# Patient Record
Sex: FEMALE | Race: WHITE | HISPANIC OR LATINO
[De-identification: names, ages, dates, MRNs, and addresses within clinical notes are randomized per-mention and may not be internally consistent; named-entity substitution may affect disease eponyms.]

---

## 2022-05-12 PROBLEM — Z00.00 ENCOUNTER FOR PREVENTIVE HEALTH EXAMINATION: Status: ACTIVE | Noted: 2022-05-12

## 2022-05-17 ENCOUNTER — NON-APPOINTMENT (OUTPATIENT)
Age: 32
End: 2022-05-17

## 2022-05-17 ENCOUNTER — APPOINTMENT (OUTPATIENT)
Dept: OBGYN | Facility: CLINIC | Age: 32
End: 2022-05-17
Payer: COMMERCIAL

## 2022-05-17 VITALS
HEIGHT: 61 IN | BODY MASS INDEX: 35.87 KG/M2 | SYSTOLIC BLOOD PRESSURE: 120 MMHG | WEIGHT: 190 LBS | DIASTOLIC BLOOD PRESSURE: 66 MMHG

## 2022-05-17 PROCEDURE — 99395 PREV VISIT EST AGE 18-39: CPT

## 2022-05-17 PROCEDURE — 36415 COLL VENOUS BLD VENIPUNCTURE: CPT

## 2022-05-19 LAB — HPV HIGH+LOW RISK DNA PNL CVX: NOT DETECTED

## 2022-05-19 NOTE — DISCUSSION/SUMMARY
[FreeTextEntry1] : Ioana and I had an extensive discussion about the pathology and physiology involved in conception.  Using a pelvic model I demonstrated where the passes are sperm travels through the cervix into the fallopian tube and hopefully to find an headache for conception we discussed all the areas where problems can arise.  It is curious that she has been in a relationship for quite some time and not protecting herself with contraceptives she has not had a pregnancy.  She is very interested in pursuing a pregnancy and we discussed some of the things that we can do an advance to evaluate her.  We will schedule a hysterosalpingogram to make sure her tubes are open and we'll do some blood work to check her female hormone levels prolactin level thyroid-stimulating hormone level and her hemoglobin A1c to rule out type 2 diabetes.\par \par We will also consider a semen analysis for her  and I gave her instructions about how to obtain this.  We discussed the timing for the hysterosalpingogram which should be right after her period.  All questions were answered support was given and she will return to the office in 3 weeks time.

## 2022-05-19 NOTE — HISTORY OF PRESENT ILLNESS
[Y] : Patient is sexually active [N] : Patient denies prior pregnancies [Menarche Age: ____] : age at menarche was [unfilled] [Currently Active] : currently active [PapSmeardate] : 4/1/19 [TextBox_31] : as per pt  [LMPDate] : 4/15/22 [PGHxTotal] : 0 [FreeTextEntry1] : 4/15/22

## 2022-05-19 NOTE — PHYSICAL EXAM
[Chaperone Present] : A chaperone was present in the examining room during all aspects of the physical examination [FreeTextEntry1] : Fatoumata Stiles MA [Appropriately responsive] : appropriately responsive [Alert] : alert [No Acute Distress] : no acute distress [No Lymphadenopathy] : no lymphadenopathy [Regular Rate Rhythm] : regular rate rhythm [No Murmurs] : no murmurs [Clear to Auscultation B/L] : clear to auscultation bilaterally [Soft] : soft [Non-tender] : non-tender [Non-distended] : non-distended [No HSM] : No HSM [No Lesions] : no lesions [No Mass] : no mass [Oriented x3] : oriented x3 [Examination Of The Breasts] : a normal appearance [No Masses] : no breast masses were palpable [Labia Majora] : normal [Labia Minora] : normal [Normal] : normal [Uterine Adnexae] : normal

## 2022-05-24 LAB — CYTOLOGY CVX/VAG DOC THIN PREP: NORMAL

## 2022-07-01 ENCOUNTER — APPOINTMENT (OUTPATIENT)
Dept: RADIOLOGY | Facility: HOSPITAL | Age: 32
End: 2022-07-01

## 2022-07-22 ENCOUNTER — NON-APPOINTMENT (OUTPATIENT)
Age: 32
End: 2022-07-22

## 2022-08-02 ENCOUNTER — NON-APPOINTMENT (OUTPATIENT)
Age: 32
End: 2022-08-02

## 2023-01-17 ENCOUNTER — APPOINTMENT (OUTPATIENT)
Dept: ANTEPARTUM | Facility: CLINIC | Age: 33
End: 2023-01-17
Payer: COMMERCIAL

## 2023-01-17 ENCOUNTER — ASOB RESULT (OUTPATIENT)
Age: 33
End: 2023-01-17

## 2023-01-17 PROCEDURE — 76817 TRANSVAGINAL US OBSTETRIC: CPT

## 2023-01-19 ENCOUNTER — APPOINTMENT (OUTPATIENT)
Dept: OBGYN | Facility: CLINIC | Age: 33
End: 2023-01-19
Payer: COMMERCIAL

## 2023-01-19 VITALS
BODY MASS INDEX: 36.82 KG/M2 | WEIGHT: 195 LBS | DIASTOLIC BLOOD PRESSURE: 79 MMHG | HEIGHT: 61 IN | SYSTOLIC BLOOD PRESSURE: 120 MMHG

## 2023-01-19 DIAGNOSIS — Z80.0 FAMILY HISTORY OF MALIGNANT NEOPLASM OF DIGESTIVE ORGANS: ICD-10-CM

## 2023-01-19 DIAGNOSIS — Z78.9 OTHER SPECIFIED HEALTH STATUS: ICD-10-CM

## 2023-01-19 LAB
BASOPHILS # BLD AUTO: 0.04 K/UL
BASOPHILS NFR BLD AUTO: 0.6 %
EOSINOPHIL # BLD AUTO: 0.14 K/UL
EOSINOPHIL NFR BLD AUTO: 2.3 %
ESTIMATED AVERAGE GLUCOSE: 105 MG/DL
ESTRADIOL SERPL-MCNC: 19 PG/ML
FSH SERPL-MCNC: 5.3 IU/L
HBA1C MFR BLD HPLC: 5.3 %
HCT VFR BLD CALC: 41.1 %
HGB BLD-MCNC: 13 G/DL
IMM GRANULOCYTES NFR BLD AUTO: 0.2 %
LH SERPL-ACNC: 4 IU/L
LYMPHOCYTES # BLD AUTO: 2.53 K/UL
LYMPHOCYTES NFR BLD AUTO: 40.9 %
MAN DIFF?: NORMAL
MCHC RBC-ENTMCNC: 28.9 PG
MCHC RBC-ENTMCNC: 31.6 GM/DL
MCV RBC AUTO: 91.3 FL
MONOCYTES # BLD AUTO: 0.5 K/UL
MONOCYTES NFR BLD AUTO: 8.1 %
NEUTROPHILS # BLD AUTO: 2.96 K/UL
NEUTROPHILS NFR BLD AUTO: 47.9 %
PLATELET # BLD AUTO: 275 K/UL
PROLACTIN SERPL-MCNC: 10.4 NG/ML
RBC # BLD: 4.5 M/UL
RBC # FLD: 12.4 %
TSH SERPL-ACNC: 1.4 UIU/ML
WBC # FLD AUTO: 6.18 K/UL

## 2023-01-19 PROCEDURE — 99214 OFFICE O/P EST MOD 30 MIN: CPT

## 2023-01-19 PROCEDURE — 36415 COLL VENOUS BLD VENIPUNCTURE: CPT

## 2023-01-19 NOTE — HISTORY OF PRESENT ILLNESS
[PapSmeardate] : 5/17/22 [TextBox_31] : neg [HPVDate] : 5/17/22 [TextBox_78] : neg [LMPDate] : 11/21/22 [PGxTotal] : 1 [TextBox_6] : 11/21/22 [FreeTextEntry1] : 11/21/22

## 2023-01-19 NOTE — PHYSICAL EXAM
[Chaperone Present] : A chaperone was present in the examining room during all aspects of the physical examination [Appropriately responsive] : appropriately responsive [FreeTextEntry1] : Leeann Donnelly [Alert] : alert [No Acute Distress] : no acute distress [No Lymphadenopathy] : no lymphadenopathy [Regular Rate Rhythm] : regular rate rhythm [Clear to Auscultation B/L] : clear to auscultation bilaterally [Soft] : soft [Non-tender] : non-tender [No Mass] : no mass [Labia Majora] : normal [Labia Minora] : normal [Normal] : normal [Uterine Adnexae] : normal

## 2023-01-19 NOTE — PLAN
[FreeTextEntry1] : We discussed the nutritional needs and restrictions of pregnancy. She is of [place] and [place] descent. She will have universal carrier screening, We discussed the possible outcomes, pros and cons of this and the benefit to understanding risk for the baby.\par \par We discussed the collaborative nature of our practice and she is aware that we [place] [facility] [place]. \par \par All questions were answered and she will RTO in 3 weeks for a Nuchal sonogram and NIPs.

## 2023-02-07 ENCOUNTER — APPOINTMENT (OUTPATIENT)
Dept: ANTEPARTUM | Facility: CLINIC | Age: 33
End: 2023-02-07

## 2023-02-07 ENCOUNTER — APPOINTMENT (OUTPATIENT)
Dept: ANTEPARTUM | Facility: CLINIC | Age: 33
End: 2023-02-07
Payer: COMMERCIAL

## 2023-02-07 ENCOUNTER — ASOB RESULT (OUTPATIENT)
Age: 33
End: 2023-02-07

## 2023-02-07 ENCOUNTER — APPOINTMENT (OUTPATIENT)
Dept: OBGYN | Facility: CLINIC | Age: 33
End: 2023-02-07

## 2023-02-07 PROCEDURE — 76801 OB US < 14 WKS SINGLE FETUS: CPT

## 2023-02-14 ENCOUNTER — NON-APPOINTMENT (OUTPATIENT)
Age: 33
End: 2023-02-14

## 2023-02-15 ENCOUNTER — NON-APPOINTMENT (OUTPATIENT)
Age: 33
End: 2023-02-15

## 2023-02-21 ENCOUNTER — APPOINTMENT (OUTPATIENT)
Dept: ANTEPARTUM | Facility: CLINIC | Age: 33
End: 2023-02-21
Payer: COMMERCIAL

## 2023-02-21 ENCOUNTER — ASOB RESULT (OUTPATIENT)
Age: 33
End: 2023-02-21

## 2023-02-21 ENCOUNTER — APPOINTMENT (OUTPATIENT)
Dept: OBGYN | Facility: CLINIC | Age: 33
End: 2023-02-21
Payer: COMMERCIAL

## 2023-02-21 VITALS
HEIGHT: 61 IN | BODY MASS INDEX: 37.19 KG/M2 | WEIGHT: 197 LBS | DIASTOLIC BLOOD PRESSURE: 79 MMHG | SYSTOLIC BLOOD PRESSURE: 117 MMHG

## 2023-02-21 LAB
ABO + RH PNL BLD: NORMAL
B19V IGG SER QL IA: 6.76 INDEX
B19V IGG+IGM SER-IMP: NORMAL
B19V IGG+IGM SER-IMP: POSITIVE
B19V IGM FLD-ACNC: 0.11 INDEX
B19V IGM SER-ACNC: NEGATIVE
BASOPHILS # BLD AUTO: 0.05 K/UL
BASOPHILS NFR BLD AUTO: 0.5 %
BILIRUB UR QL STRIP: NORMAL
BLD GP AB SCN SERPL QL: NORMAL
C TRACH RRNA SPEC QL NAA+PROBE: NOT DETECTED
CMV IGG SERPL QL: <0.2 U/ML
CMV IGG SERPL-IMP: NEGATIVE
CMV IGM SERPL QL: <8 AU/ML
CMV IGM SERPL QL: NEGATIVE
EOSINOPHIL # BLD AUTO: 0.07 K/UL
EOSINOPHIL NFR BLD AUTO: 0.7 %
ESTIMATED AVERAGE GLUCOSE: 103 MG/DL
GLUCOSE UR-MCNC: NORMAL
HBA1C MFR BLD HPLC: 5.2 %
HBV SURFACE AG SER QL: NONREACTIVE
HCG UR QL: 0.2 EU/DL
HCG UR QL: POSITIVE
HCT VFR BLD CALC: 38 %
HGB A MFR BLD: 97.5 %
HGB A2 MFR BLD: 2.5 %
HGB BLD-MCNC: 13.2 G/DL
HGB FRACT BLD-IMP: NORMAL
HGB UR QL STRIP.AUTO: ABNORMAL
HIV1+2 AB SPEC QL IA.RAPID: NONREACTIVE
IMM GRANULOCYTES NFR BLD AUTO: 0.5 %
KETONES UR-MCNC: NORMAL
LEUKOCYTE ESTERASE UR QL STRIP: NORMAL
LYMPHOCYTES # BLD AUTO: 2.31 K/UL
LYMPHOCYTES NFR BLD AUTO: 22.4 %
MAN DIFF?: NORMAL
MCHC RBC-ENTMCNC: 30.6 PG
MCHC RBC-ENTMCNC: 34.7 GM/DL
MCV RBC AUTO: 88.2 FL
MEV IGG FLD QL IA: 188 AU/ML
MEV IGG+IGM SER-IMP: POSITIVE
MONOCYTES # BLD AUTO: 0.64 K/UL
MONOCYTES NFR BLD AUTO: 6.2 %
N GONORRHOEA RRNA SPEC QL NAA+PROBE: NOT DETECTED
NEUTROPHILS # BLD AUTO: 7.2 K/UL
NEUTROPHILS NFR BLD AUTO: 69.7 %
NITRITE UR QL STRIP: NORMAL
PH UR STRIP: 6
PLATELET # BLD AUTO: 303 K/UL
PROT UR STRIP-MCNC: NORMAL
QUALITY CONTROL: YES
RBC # BLD: 4.31 M/UL
RBC # FLD: 12.2 %
RUBV IGG FLD-ACNC: 1.8 INDEX
RUBV IGG SER-IMP: POSITIVE
SOURCE AMPLIFICATION: NORMAL
SP GR UR STRIP: 1.02
T GONDII AB SER-IMP: NEGATIVE
T GONDII IGM SER QL: <3 AU/ML
T PALLIDUM AB SER QL IA: NEGATIVE
TSH SERPL-ACNC: 0.94 UIU/ML
WBC # FLD AUTO: 10.32 K/UL

## 2023-02-21 PROCEDURE — 0502F SUBSEQUENT PRENATAL CARE: CPT

## 2023-02-21 PROCEDURE — 76801 OB US < 14 WKS SINGLE FETUS: CPT

## 2023-02-21 PROCEDURE — 36415 COLL VENOUS BLD VENIPUNCTURE: CPT

## 2023-02-28 ENCOUNTER — ASOB RESULT (OUTPATIENT)
Age: 33
End: 2023-02-28

## 2023-02-28 ENCOUNTER — APPOINTMENT (OUTPATIENT)
Dept: MATERNAL FETAL MEDICINE | Facility: CLINIC | Age: 33
End: 2023-02-28
Payer: COMMERCIAL

## 2023-02-28 PROCEDURE — 99202 OFFICE O/P NEW SF 15 MIN: CPT | Mod: 95

## 2023-03-06 ENCOUNTER — NON-APPOINTMENT (OUTPATIENT)
Age: 33
End: 2023-03-06

## 2023-03-13 ENCOUNTER — NON-APPOINTMENT (OUTPATIENT)
Age: 33
End: 2023-03-13

## 2023-03-17 ENCOUNTER — NON-APPOINTMENT (OUTPATIENT)
Age: 33
End: 2023-03-17

## 2023-03-21 ENCOUNTER — NON-APPOINTMENT (OUTPATIENT)
Age: 33
End: 2023-03-21

## 2023-03-22 ENCOUNTER — NON-APPOINTMENT (OUTPATIENT)
Age: 33
End: 2023-03-22

## 2023-03-28 ENCOUNTER — APPOINTMENT (OUTPATIENT)
Dept: OBGYN | Facility: CLINIC | Age: 33
End: 2023-03-28
Payer: COMMERCIAL

## 2023-03-28 VITALS
DIASTOLIC BLOOD PRESSURE: 69 MMHG | SYSTOLIC BLOOD PRESSURE: 107 MMHG | HEIGHT: 61 IN | BODY MASS INDEX: 37.57 KG/M2 | WEIGHT: 199 LBS

## 2023-03-28 LAB
BILIRUB UR QL STRIP: NORMAL
CHROMOSOME13 INTERPRETATION: NORMAL
CHROMOSOME13 TEST RESULT: NORMAL
CHROMOSOME18 INTERPRETATION: NORMAL
CHROMOSOME18 TEST RESULT: NORMAL
CHROMOSOME21 INTERPRETATION: NORMAL
CHROMOSOME21 TEST RESULT: NORMAL
FETAL FRACTION: NORMAL
GLUCOSE UR-MCNC: NORMAL
HCG UR QL: 0.2 EU/DL
HGB UR QL STRIP.AUTO: NORMAL
KETONES UR-MCNC: NORMAL
LEUKOCYTE ESTERASE UR QL STRIP: NORMAL
MICRODELETIONS INTERPRETATION: NORMAL
MICRODELETIONS RESULT: NORMAL
NITRITE UR QL STRIP: NORMAL
PERFORMANCE AND LIMITATIONS: NORMAL
PH UR STRIP: 5.5
PROT UR STRIP-MCNC: NORMAL
SEX CHROMOSOME INTERPRETATION: NORMAL
SEX CHROMOSOME TEST RESULT: NORMAL
SP GR UR STRIP: 1.01
VERIFI WITH MICRODELETIONS: NOT DETECTED

## 2023-03-28 PROCEDURE — 0502F SUBSEQUENT PRENATAL CARE: CPT

## 2023-03-28 PROCEDURE — 36415 COLL VENOUS BLD VENIPUNCTURE: CPT

## 2023-04-07 ENCOUNTER — APPOINTMENT (OUTPATIENT)
Dept: MATERNAL FETAL MEDICINE | Facility: CLINIC | Age: 33
End: 2023-04-07
Payer: COMMERCIAL

## 2023-04-07 ENCOUNTER — ASOB RESULT (OUTPATIENT)
Age: 33
End: 2023-04-07

## 2023-04-07 PROCEDURE — 99212 OFFICE O/P EST SF 10 MIN: CPT | Mod: 95

## 2023-04-12 ENCOUNTER — APPOINTMENT (OUTPATIENT)
Dept: ANTEPARTUM | Facility: CLINIC | Age: 33
End: 2023-04-12
Payer: COMMERCIAL

## 2023-04-12 ENCOUNTER — NON-APPOINTMENT (OUTPATIENT)
Age: 33
End: 2023-04-12

## 2023-04-12 ENCOUNTER — ASOB RESULT (OUTPATIENT)
Age: 33
End: 2023-04-12

## 2023-04-12 PROCEDURE — 76817 TRANSVAGINAL US OBSTETRIC: CPT

## 2023-04-12 PROCEDURE — 76811 OB US DETAILED SNGL FETUS: CPT

## 2023-04-13 ENCOUNTER — NON-APPOINTMENT (OUTPATIENT)
Age: 33
End: 2023-04-13

## 2023-04-25 ENCOUNTER — APPOINTMENT (OUTPATIENT)
Dept: OBGYN | Facility: CLINIC | Age: 33
End: 2023-04-25
Payer: COMMERCIAL

## 2023-04-25 VITALS
SYSTOLIC BLOOD PRESSURE: 113 MMHG | WEIGHT: 202 LBS | HEIGHT: 61 IN | BODY MASS INDEX: 38.14 KG/M2 | DIASTOLIC BLOOD PRESSURE: 72 MMHG

## 2023-04-25 LAB
AFP MOM: 0.94
AFP VALUE: 35.3 NG/ML
ALPHA FETOPROTEIN SERUM COMMENT: NORMAL
ALPHA FETOPROTEIN SERUM INTERPRETATION: NORMAL
ALPHA FETOPROTEIN SERUM RESULTS: NORMAL
ALPHA FETOPROTEIN SERUM TEST RESULTS: NORMAL
BILIRUB UR QL STRIP: NORMAL
GESTATIONAL AGE BASED ON: NORMAL
GESTATIONAL AGE ON COLLECTION DATE: 18 WEEKS
GLUCOSE UR-MCNC: NORMAL
HCG UR QL: 0.2 EU/DL
HGB UR QL STRIP.AUTO: NORMAL
INSULIN DEP DIABETES: NO
KETONES UR-MCNC: NORMAL
LEUKOCYTE ESTERASE UR QL STRIP: NORMAL
MATERNAL AGE AT EDD AFP: 31.3 YR
MULTIPLE GESTATION: NO
NITRITE UR QL STRIP: NORMAL
OSBR RISK 1 IN: NORMAL
PH UR STRIP: 6.5
PROT UR STRIP-MCNC: NORMAL
RACE: NORMAL
SP GR UR STRIP: 1.02
WEIGHT AFP: 199 LBS

## 2023-04-25 PROCEDURE — 0502F SUBSEQUENT PRENATAL CARE: CPT

## 2023-04-26 ENCOUNTER — ASOB RESULT (OUTPATIENT)
Age: 33
End: 2023-04-26

## 2023-04-26 ENCOUNTER — APPOINTMENT (OUTPATIENT)
Dept: ANTEPARTUM | Facility: CLINIC | Age: 33
End: 2023-04-26
Payer: COMMERCIAL

## 2023-04-26 PROCEDURE — 76816 OB US FOLLOW-UP PER FETUS: CPT

## 2023-05-23 ENCOUNTER — APPOINTMENT (OUTPATIENT)
Dept: OBGYN | Facility: CLINIC | Age: 33
End: 2023-05-23
Payer: COMMERCIAL

## 2023-05-23 VITALS
HEIGHT: 61 IN | DIASTOLIC BLOOD PRESSURE: 71 MMHG | BODY MASS INDEX: 38.89 KG/M2 | SYSTOLIC BLOOD PRESSURE: 113 MMHG | WEIGHT: 206 LBS

## 2023-05-23 LAB
BILIRUB UR QL STRIP: NORMAL
CLARITY UR: CLEAR
COLLECTION METHOD: NORMAL
GLUCOSE BLDC GLUCOMTR-MCNC: 78
GLUCOSE UR-MCNC: NORMAL
HCG UR QL: 0.2 EU/DL
HGB UR QL STRIP.AUTO: NORMAL
KETONES UR-MCNC: NORMAL
LEUKOCYTE ESTERASE UR QL STRIP: NORMAL
NITRITE UR QL STRIP: NORMAL
PH UR STRIP: 7.5
PROT UR STRIP-MCNC: NORMAL
SP GR UR STRIP: 1.02

## 2023-05-23 PROCEDURE — 0502F SUBSEQUENT PRENATAL CARE: CPT

## 2023-05-30 ENCOUNTER — APPOINTMENT (OUTPATIENT)
Dept: OBGYN | Facility: CLINIC | Age: 33
End: 2023-05-30
Payer: COMMERCIAL

## 2023-05-30 VITALS
DIASTOLIC BLOOD PRESSURE: 81 MMHG | SYSTOLIC BLOOD PRESSURE: 115 MMHG | WEIGHT: 204.38 LBS | HEIGHT: 61 IN | BODY MASS INDEX: 38.59 KG/M2

## 2023-05-30 LAB
BILIRUB UR QL STRIP: NORMAL
GLUCOSE BLDC GLUCOMTR-MCNC: 71
GLUCOSE UR-MCNC: NORMAL
HCG UR QL: 0.2 EU/DL
HGB UR QL STRIP.AUTO: NORMAL
KETONES UR-MCNC: 15
LEUKOCYTE ESTERASE UR QL STRIP: NORMAL
NITRITE UR QL STRIP: NORMAL
PH UR STRIP: 6.5
PROT UR STRIP-MCNC: NORMAL
SP GR UR STRIP: 1.01

## 2023-05-30 PROCEDURE — 0502F SUBSEQUENT PRENATAL CARE: CPT

## 2023-05-30 PROCEDURE — 82962 GLUCOSE BLOOD TEST: CPT

## 2023-06-05 ENCOUNTER — APPOINTMENT (OUTPATIENT)
Dept: OBGYN | Facility: CLINIC | Age: 33
End: 2023-06-05
Payer: COMMERCIAL

## 2023-06-05 VITALS
WEIGHT: 210.13 LBS | BODY MASS INDEX: 39.67 KG/M2 | SYSTOLIC BLOOD PRESSURE: 122 MMHG | DIASTOLIC BLOOD PRESSURE: 85 MMHG | HEIGHT: 61 IN

## 2023-06-05 LAB
BILIRUB UR QL STRIP: NORMAL
COLLECTION METHOD: NORMAL
GLUCOSE 1H P 50 G GLC PO SERPL-MCNC: 138 MG/DL
GLUCOSE UR-MCNC: NORMAL
HCG UR QL: 0.2 EU/DL
HGB UR QL STRIP.AUTO: NORMAL
KETONES UR-MCNC: NORMAL
LEUKOCYTE ESTERASE UR QL STRIP: NORMAL
NITRITE UR QL STRIP: NORMAL
PH UR STRIP: 5.5
PROT UR STRIP-MCNC: NORMAL
SP GR UR STRIP: 1.01

## 2023-06-05 PROCEDURE — 0502F SUBSEQUENT PRENATAL CARE: CPT

## 2023-06-06 ENCOUNTER — OFFICE (OUTPATIENT)
Dept: URBAN - METROPOLITAN AREA CLINIC 103 | Facility: CLINIC | Age: 33
Setting detail: OPHTHALMOLOGY
End: 2023-06-06
Payer: COMMERCIAL

## 2023-06-06 DIAGNOSIS — H43.392: ICD-10-CM

## 2023-06-06 PROCEDURE — 92012 INTRM OPH EXAM EST PATIENT: CPT | Performed by: OPHTHALMOLOGY

## 2023-06-06 ASSESSMENT — REFRACTION_AUTOREFRACTION
OS_AXIS: 097
OS_SPHERE: PLANO
OD_AXIS: 058
OS_CYLINDER: -0.75
OD_SPHERE: -1.25
OD_CYLINDER: -0.50

## 2023-06-06 ASSESSMENT — TONOMETRY
OS_IOP_MMHG: 21
OD_IOP_MMHG: 21

## 2023-06-06 ASSESSMENT — KERATOMETRY
OD_AXISANGLE_DEGREES: 078
OD_K1POWER_DIOPTERS: 45.00
OD_K2POWER_DIOPTERS: 45.50
OS_K2POWER_DIOPTERS: 45.50
OS_K1POWER_DIOPTERS: 45.25
OS_AXISANGLE_DEGREES: 138

## 2023-06-06 ASSESSMENT — CONFRONTATIONAL VISUAL FIELD TEST (CVF)
OS_FINDINGS: FULL
OD_FINDINGS: FULL

## 2023-06-06 ASSESSMENT — SPHEQUIV_DERIVED: OD_SPHEQUIV: -1.5

## 2023-06-06 ASSESSMENT — VISUAL ACUITY
OS_BCVA: 20/20
OD_BCVA: 20/30+2

## 2023-06-06 ASSESSMENT — AXIALLENGTH_DERIVED: OD_AL: 23.5337

## 2023-06-12 ENCOUNTER — NON-APPOINTMENT (OUTPATIENT)
Age: 33
End: 2023-06-12

## 2023-06-14 ENCOUNTER — APPOINTMENT (OUTPATIENT)
Dept: OBGYN | Facility: CLINIC | Age: 33
End: 2023-06-14
Payer: COMMERCIAL

## 2023-06-14 ENCOUNTER — NON-APPOINTMENT (OUTPATIENT)
Age: 33
End: 2023-06-14

## 2023-06-14 ENCOUNTER — LABORATORY RESULT (OUTPATIENT)
Age: 33
End: 2023-06-14

## 2023-06-14 VITALS
BODY MASS INDEX: 39.3 KG/M2 | SYSTOLIC BLOOD PRESSURE: 98 MMHG | WEIGHT: 208.13 LBS | HEIGHT: 61 IN | DIASTOLIC BLOOD PRESSURE: 66 MMHG

## 2023-06-14 PROCEDURE — 90715 TDAP VACCINE 7 YRS/> IM: CPT

## 2023-06-14 PROCEDURE — 0502F SUBSEQUENT PRENATAL CARE: CPT

## 2023-06-14 PROCEDURE — 90471 IMMUNIZATION ADMIN: CPT

## 2023-06-19 LAB
BILIRUB UR QL STRIP: NORMAL
GLUCOSE UR-MCNC: NORMAL
HCG UR QL: 0.2 EU/DL
HGB UR QL STRIP.AUTO: NORMAL
KETONES UR-MCNC: NORMAL
LEUKOCYTE ESTERASE UR QL STRIP: NORMAL
NITRITE UR QL STRIP: NORMAL
PH UR STRIP: 6.5
PROT UR STRIP-MCNC: NORMAL
SP GR UR STRIP: 1.02

## 2023-06-22 ENCOUNTER — APPOINTMENT (OUTPATIENT)
Dept: OBGYN | Facility: CLINIC | Age: 33
End: 2023-06-22
Payer: COMMERCIAL

## 2023-06-22 ENCOUNTER — TRANSCRIPTION ENCOUNTER (OUTPATIENT)
Age: 33
End: 2023-06-22

## 2023-06-22 VITALS
HEIGHT: 61 IN | BODY MASS INDEX: 39.27 KG/M2 | SYSTOLIC BLOOD PRESSURE: 102 MMHG | WEIGHT: 208 LBS | DIASTOLIC BLOOD PRESSURE: 70 MMHG

## 2023-06-22 LAB
BILIRUB UR QL STRIP: NORMAL
GLUCOSE UR-MCNC: NORMAL
HCG UR QL: 0.2 EU/DL
HGB UR QL STRIP.AUTO: NORMAL
KETONES UR-MCNC: NORMAL
LEUKOCYTE ESTERASE UR QL STRIP: NORMAL
NITRITE UR QL STRIP: NORMAL
PH UR STRIP: 5.5
PROT UR STRIP-MCNC: NORMAL
SP GR UR STRIP: 1.02

## 2023-06-22 PROCEDURE — 0502F SUBSEQUENT PRENATAL CARE: CPT

## 2023-06-23 ENCOUNTER — ASOB RESULT (OUTPATIENT)
Age: 33
End: 2023-06-23

## 2023-06-23 ENCOUNTER — APPOINTMENT (OUTPATIENT)
Dept: MATERNAL FETAL MEDICINE | Facility: CLINIC | Age: 33
End: 2023-06-23
Payer: COMMERCIAL

## 2023-06-23 PROCEDURE — G0108 DIAB MANAGE TRN  PER INDIV: CPT | Mod: 95

## 2023-06-24 ENCOUNTER — NON-APPOINTMENT (OUTPATIENT)
Age: 33
End: 2023-06-24

## 2023-07-05 ENCOUNTER — ASOB RESULT (OUTPATIENT)
Age: 33
End: 2023-07-05

## 2023-07-05 ENCOUNTER — APPOINTMENT (OUTPATIENT)
Dept: ANTEPARTUM | Facility: CLINIC | Age: 33
End: 2023-07-05
Payer: COMMERCIAL

## 2023-07-05 ENCOUNTER — APPOINTMENT (OUTPATIENT)
Dept: OBGYN | Facility: CLINIC | Age: 33
End: 2023-07-05
Payer: COMMERCIAL

## 2023-07-05 VITALS
HEIGHT: 61 IN | WEIGHT: 206.06 LBS | SYSTOLIC BLOOD PRESSURE: 120 MMHG | DIASTOLIC BLOOD PRESSURE: 84 MMHG | BODY MASS INDEX: 38.91 KG/M2

## 2023-07-05 LAB
BILIRUB UR QL STRIP: NORMAL
COLLECTION METHOD: NORMAL
GLUCOSE UR-MCNC: NORMAL
HCG UR QL: 0.2 EU/DL
HGB UR QL STRIP.AUTO: NORMAL
KETONES UR-MCNC: NORMAL
LEUKOCYTE ESTERASE UR QL STRIP: NORMAL
NITRITE UR QL STRIP: NORMAL
PH UR STRIP: 6
PROT UR STRIP-MCNC: NORMAL
SP GR UR STRIP: 1.01

## 2023-07-05 PROCEDURE — 0502F SUBSEQUENT PRENATAL CARE: CPT

## 2023-07-05 PROCEDURE — 76816 OB US FOLLOW-UP PER FETUS: CPT

## 2023-07-07 ENCOUNTER — ASOB RESULT (OUTPATIENT)
Age: 33
End: 2023-07-07

## 2023-07-07 ENCOUNTER — APPOINTMENT (OUTPATIENT)
Dept: MATERNAL FETAL MEDICINE | Facility: CLINIC | Age: 33
End: 2023-07-07
Payer: COMMERCIAL

## 2023-07-07 PROCEDURE — G0108 DIAB MANAGE TRN  PER INDIV: CPT | Mod: 95

## 2023-07-12 ENCOUNTER — NON-APPOINTMENT (OUTPATIENT)
Age: 33
End: 2023-07-12

## 2023-07-12 ENCOUNTER — APPOINTMENT (OUTPATIENT)
Dept: OBGYN | Facility: CLINIC | Age: 33
End: 2023-07-12
Payer: COMMERCIAL

## 2023-07-12 ENCOUNTER — APPOINTMENT (OUTPATIENT)
Dept: ANTEPARTUM | Facility: CLINIC | Age: 33
End: 2023-07-12
Payer: COMMERCIAL

## 2023-07-12 ENCOUNTER — ASOB RESULT (OUTPATIENT)
Age: 33
End: 2023-07-12

## 2023-07-12 VITALS
BODY MASS INDEX: 39.27 KG/M2 | DIASTOLIC BLOOD PRESSURE: 72 MMHG | HEIGHT: 61 IN | WEIGHT: 208 LBS | SYSTOLIC BLOOD PRESSURE: 108 MMHG

## 2023-07-12 LAB
BILIRUB UR QL STRIP: NORMAL
GLUCOSE UR-MCNC: NORMAL
HCG UR QL: 0.2 EU/DL
HGB UR QL STRIP.AUTO: ABNORMAL
KETONES UR-MCNC: NORMAL
LEUKOCYTE ESTERASE UR QL STRIP: NORMAL
NITRITE UR QL STRIP: NORMAL
PH UR STRIP: 7
PROT UR STRIP-MCNC: NORMAL
SP GR UR STRIP: 1.01

## 2023-07-12 PROCEDURE — 0502F SUBSEQUENT PRENATAL CARE: CPT

## 2023-07-12 PROCEDURE — 76820 UMBILICAL ARTERY ECHO: CPT

## 2023-07-12 PROCEDURE — 59025 FETAL NON-STRESS TEST: CPT

## 2023-07-12 PROCEDURE — 76819 FETAL BIOPHYS PROFIL W/O NST: CPT

## 2023-07-12 PROCEDURE — 93976 VASCULAR STUDY: CPT

## 2023-07-17 ENCOUNTER — APPOINTMENT (OUTPATIENT)
Dept: MATERNAL FETAL MEDICINE | Facility: CLINIC | Age: 33
End: 2023-07-17
Payer: COMMERCIAL

## 2023-07-17 ENCOUNTER — APPOINTMENT (OUTPATIENT)
Dept: ANTEPARTUM | Facility: CLINIC | Age: 33
End: 2023-07-17
Payer: COMMERCIAL

## 2023-07-17 ENCOUNTER — APPOINTMENT (OUTPATIENT)
Dept: ANTEPARTUM | Facility: CLINIC | Age: 33
End: 2023-07-17

## 2023-07-17 VITALS
SYSTOLIC BLOOD PRESSURE: 122 MMHG | OXYGEN SATURATION: 99 % | HEART RATE: 88 BPM | DIASTOLIC BLOOD PRESSURE: 84 MMHG | BODY MASS INDEX: 39.65 KG/M2 | RESPIRATION RATE: 16 BRPM | HEIGHT: 61 IN | WEIGHT: 210 LBS

## 2023-07-17 DIAGNOSIS — Z87.898 PERSONAL HISTORY OF OTHER SPECIFIED CONDITIONS: ICD-10-CM

## 2023-07-17 DIAGNOSIS — Z01.419 ENCOUNTER FOR GYNECOLOGICAL EXAMINATION (GENERAL) (ROUTINE) W/OUT ABNORMAL FINDINGS: ICD-10-CM

## 2023-07-17 DIAGNOSIS — Z87.42 PERSONAL HISTORY OF OTHER DISEASES OF THE FEMALE GENITAL TRACT: ICD-10-CM

## 2023-07-17 PROCEDURE — 99205 OFFICE O/P NEW HI 60 MIN: CPT

## 2023-07-17 PROCEDURE — 36415 COLL VENOUS BLD VENIPUNCTURE: CPT

## 2023-07-17 NOTE — FAMILY HISTORY
[Age 35+ During Pregnancy] : not 35 or over during pregnancy [Reported Family History Of Birth Defects] : no congenital heart defects [Irvin-Sachs Carrier] : no Irvin-Sachs [Family History] : no mental retardation/autism [Reported Family History Of Genetic Disease] : no history of child defect in child of baby father

## 2023-07-17 NOTE — VITALS
[LMP (date): ___] : LMP was on [unfilled] [GA =___ Weeks] : which calculates to a GA of [unfilled] weeks [GA= ___ Days] : and [unfilled] day(s) [HILTON by LMP (date): ___] : The calculated HILTON by LMP is [unfilled] [By LMP] : this is the final HILTON

## 2023-07-17 NOTE — DISCUSSION/SUMMARY
[FreeTextEntry1] : She is 33 weeks and 6 days gestation by her last menstrual period dates.  The fetal heart rate was 135 bpm.\par \par She is overweight and obesity has been associated with a number of maternal complications such as gestational diabetes, pre-eclampsia, thrombophlebitis, labor abnormalities, post-term pregnancies,  delivery, and operative complications. Obesity has been associated with adverse fetal outcomes such as late stillbirth and  deliveries.  Obese women also have a two to three-fold increased incidence in congenital anomalies. There were no major fetal malformations seen during the fetal anatomy ultrasound examination.  She has been diagnosed with gestational diabetes.\par \par Regarding her gestational diabetes, she had a telehealth visit with the diabetes educator on 2023 for initial diabetes education and counseling.  She had a follow-up telehealth visit with the diabetes educator on 2023.  She states that she has been following the recommended diabetic diet. She performs fasting and 1 hour postprandial self glucose monitoring from the beginning of the meal. My review of her food  /glucose log book from 2023 to 2023 revealed normal fasting glucose values.  She had 2 elevated postprandial glucose readings of 145 and 159.  She appears to be in good glycemic control. I informed her that maintaining euglycemia is the most important factor associated with good  outcomes in pregnancies complicated by gestational diabetes. I told her that poor glucose control may cause fetal macrosomia, shoulder dystocia,  delivery, stillbirth,  respiratory distress syndrome and  metabolic complications such as hypoglycemia and hyperbilirubinemia.  I also told her that she is at risk for developing type 2 diabetes, metabolic syndrome and cardiovascular disease later in life.  I also recommended having a 75 gram 2 hour OGTT approximately 6 - 8 weeks postpartum to determine whether she has impaired glucose tolerance or preexisting diabetes not diagnosed prior to the pregnancy. I gave her dietary counseling. I told her which foods to eat and which foods not to eat. I encouraged her to eat three daily meals with 3 snacks to reduce postprandial glucose fluctuations. She was advised to bring the food / glucose diary to her prenatal visits.  She was advised to have a follow-up [facility] visit in 3 weeks.\par \par I discussed the 2023 ultrasound finding of a small size fetus at the 5th percentile for gestational age. The fetal AC measurement was at the 2nd percentile for gestational age. These findings are consistent with fetal growth restriction. The amniotic fluid volume was normal. The umbilical artery S/D ratio was normal for gestational age. The middle cerebral artery Doppler study was not done. The  uterine artery Doppler PIs were normal for gestational age with absent notching in the velocity waveforms. This finding is suggestive of normal maternal utero-placental circulation. The fetus was found to be healthy with a normal biophysical profile score (8/8). At this time there is no evidence of severe placental insufficiency in view of normal amniotic fluid volume and normal umbilical artery Doppler studies. I told her that many babies that are suspected to have fetal growth restriction are constitutionally small or healthy small babies.  I told her that fetal growth restriction has been associated with an increased risk for having adverse  outcomes such as intrauterine demise,  death, and  morbidity such as: hypoglycemia, hyperbilirubinemia, hypothermia, intraventricular hemorrhage, necrotizing enterocolitis, seizures, sepsis, respiratory distress syndrome, and  death. I recommend delivery between 38 0/7 - 39 0/7 weeks gestation (ACOG Committee Opinion No. 818, 2021).\par

## 2023-07-17 NOTE — OB HISTORY
[LMP: ___] : LMP: [unfilled] [HILTON: ___] : HILTON: [unfilled] [EGA: ___ wks] : EGA: [unfilled] wks [Spontaneous] : Spontaneous conception [Sonogram] : sonogram [at ___ wks] : at [unfilled] weeks [Definite:  ___ (Date)] : the last menstrual period was [unfilled] [Normal Amount/Duration] : was of a normal amount and duration [Regular Cycle Intervals] : periods have been regular [Frequency: Q ___ days] : menstrual periods occur approximately every [unfilled] days [Menarche Age: ____] : age at menarche was [unfilled] [FreeTextEntry1] : First prenatal visit was on 2/21/2023.  [Spotting Between  Menses] : no spotting between menses [Menstrual Cramps] : no menstrual cramps [On BCP at conception] : the patient was not on BCP at conception

## 2023-07-18 ENCOUNTER — NON-APPOINTMENT (OUTPATIENT)
Age: 33
End: 2023-07-18

## 2023-07-19 ENCOUNTER — APPOINTMENT (OUTPATIENT)
Dept: OBGYN | Facility: CLINIC | Age: 33
End: 2023-07-19
Payer: COMMERCIAL

## 2023-07-19 ENCOUNTER — APPOINTMENT (OUTPATIENT)
Dept: ANTEPARTUM | Facility: CLINIC | Age: 33
End: 2023-07-19
Payer: COMMERCIAL

## 2023-07-19 ENCOUNTER — ASOB RESULT (OUTPATIENT)
Age: 33
End: 2023-07-19

## 2023-07-19 ENCOUNTER — RESULT CHARGE (OUTPATIENT)
Age: 33
End: 2023-07-19

## 2023-07-19 VITALS
BODY MASS INDEX: 39.47 KG/M2 | DIASTOLIC BLOOD PRESSURE: 69 MMHG | SYSTOLIC BLOOD PRESSURE: 108 MMHG | HEIGHT: 61 IN | WEIGHT: 209.06 LBS

## 2023-07-19 LAB
BILIRUB UR QL STRIP: NORMAL
CLARITY UR: CLEAR
COLLECTION METHOD: NORMAL
ESTIMATED AVERAGE GLUCOSE: 105 MG/DL
GLUCOSE UR-MCNC: NORMAL
HBA1C MFR BLD HPLC: 5.3 %
HCG UR QL: 0.2 EU/DL
HGB UR QL STRIP.AUTO: NORMAL
KETONES UR-MCNC: NORMAL
LEUKOCYTE ESTERASE UR QL STRIP: NORMAL
NITRITE UR QL STRIP: NORMAL
PH UR STRIP: 6
PROT UR STRIP-MCNC: NORMAL
SP GR UR STRIP: 1.02

## 2023-07-19 PROCEDURE — 76816 OB US FOLLOW-UP PER FETUS: CPT

## 2023-07-19 PROCEDURE — 59025 FETAL NON-STRESS TEST: CPT

## 2023-07-19 PROCEDURE — 76819 FETAL BIOPHYS PROFIL W/O NST: CPT

## 2023-07-19 PROCEDURE — 93976 VASCULAR STUDY: CPT

## 2023-07-19 PROCEDURE — 76820 UMBILICAL ARTERY ECHO: CPT | Mod: 59

## 2023-07-19 PROCEDURE — 0502F SUBSEQUENT PRENATAL CARE: CPT

## 2023-07-26 ENCOUNTER — NON-APPOINTMENT (OUTPATIENT)
Age: 33
End: 2023-07-26

## 2023-07-26 LAB — BACTERIA UR CULT: NORMAL

## 2023-07-27 ENCOUNTER — APPOINTMENT (OUTPATIENT)
Dept: OBGYN | Facility: CLINIC | Age: 33
End: 2023-07-27
Payer: COMMERCIAL

## 2023-07-27 ENCOUNTER — NON-APPOINTMENT (OUTPATIENT)
Age: 33
End: 2023-07-27

## 2023-07-27 ENCOUNTER — ASOB RESULT (OUTPATIENT)
Age: 33
End: 2023-07-27

## 2023-07-27 ENCOUNTER — APPOINTMENT (OUTPATIENT)
Dept: ANTEPARTUM | Facility: CLINIC | Age: 33
End: 2023-07-27
Payer: COMMERCIAL

## 2023-07-27 VITALS
SYSTOLIC BLOOD PRESSURE: 116 MMHG | DIASTOLIC BLOOD PRESSURE: 73 MMHG | HEIGHT: 61 IN | WEIGHT: 212.56 LBS | BODY MASS INDEX: 40.13 KG/M2

## 2023-07-27 PROCEDURE — 76816 OB US FOLLOW-UP PER FETUS: CPT

## 2023-07-27 PROCEDURE — 59025 FETAL NON-STRESS TEST: CPT

## 2023-07-27 PROCEDURE — 0502F SUBSEQUENT PRENATAL CARE: CPT

## 2023-07-31 ENCOUNTER — NON-APPOINTMENT (OUTPATIENT)
Age: 33
End: 2023-07-31

## 2023-08-01 ENCOUNTER — ASOB RESULT (OUTPATIENT)
Age: 33
End: 2023-08-01

## 2023-08-01 ENCOUNTER — APPOINTMENT (OUTPATIENT)
Dept: OBGYN | Facility: CLINIC | Age: 33
End: 2023-08-01
Payer: COMMERCIAL

## 2023-08-01 ENCOUNTER — APPOINTMENT (OUTPATIENT)
Dept: ANTEPARTUM | Facility: CLINIC | Age: 33
End: 2023-08-01
Payer: COMMERCIAL

## 2023-08-01 VITALS
DIASTOLIC BLOOD PRESSURE: 79 MMHG | HEIGHT: 61 IN | WEIGHT: 213.5 LBS | SYSTOLIC BLOOD PRESSURE: 116 MMHG | BODY MASS INDEX: 40.31 KG/M2

## 2023-08-01 LAB
BILIRUB UR QL STRIP: NORMAL
GLUCOSE UR-MCNC: NORMAL
HCG UR QL: 0.2 EU/DL
HGB UR QL STRIP.AUTO: NORMAL
KETONES UR-MCNC: NORMAL
LEUKOCYTE ESTERASE UR QL STRIP: NORMAL
NITRITE UR QL STRIP: NORMAL
PH UR STRIP: 6
PROT UR STRIP-MCNC: NORMAL
SP GR UR STRIP: 1

## 2023-08-01 PROCEDURE — 76819 FETAL BIOPHYS PROFIL W/O NST: CPT

## 2023-08-01 PROCEDURE — 0502F SUBSEQUENT PRENATAL CARE: CPT

## 2023-08-01 PROCEDURE — 76820 UMBILICAL ARTERY ECHO: CPT

## 2023-08-01 PROCEDURE — 93976 VASCULAR STUDY: CPT

## 2023-08-04 ENCOUNTER — ASOB RESULT (OUTPATIENT)
Age: 33
End: 2023-08-04

## 2023-08-04 ENCOUNTER — APPOINTMENT (OUTPATIENT)
Dept: ANTEPARTUM | Facility: CLINIC | Age: 33
End: 2023-08-04
Payer: COMMERCIAL

## 2023-08-04 ENCOUNTER — APPOINTMENT (OUTPATIENT)
Dept: MATERNAL FETAL MEDICINE | Facility: CLINIC | Age: 33
End: 2023-08-04
Payer: COMMERCIAL

## 2023-08-04 ENCOUNTER — APPOINTMENT (OUTPATIENT)
Dept: OBGYN | Facility: CLINIC | Age: 33
End: 2023-08-04
Payer: COMMERCIAL

## 2023-08-04 VITALS
DIASTOLIC BLOOD PRESSURE: 82 MMHG | SYSTOLIC BLOOD PRESSURE: 118 MMHG | HEIGHT: 61 IN | WEIGHT: 213 LBS | BODY MASS INDEX: 40.22 KG/M2

## 2023-08-04 PROCEDURE — 93976 VASCULAR STUDY: CPT

## 2023-08-04 PROCEDURE — G0108 DIAB MANAGE TRN  PER INDIV: CPT | Mod: 95

## 2023-08-04 PROCEDURE — 76819 FETAL BIOPHYS PROFIL W/O NST: CPT

## 2023-08-04 PROCEDURE — 76816 OB US FOLLOW-UP PER FETUS: CPT

## 2023-08-04 PROCEDURE — 0502F SUBSEQUENT PRENATAL CARE: CPT

## 2023-08-04 PROCEDURE — 76820 UMBILICAL ARTERY ECHO: CPT | Mod: 59

## 2023-08-04 PROCEDURE — 59025 FETAL NON-STRESS TEST: CPT | Mod: 59

## 2023-08-06 LAB
B-HEM STREP SPEC QL CULT: NORMAL
BILIRUB UR QL STRIP: NORMAL
CLARITY UR: CLEAR
COLLECTION METHOD: NORMAL
GLUCOSE UR-MCNC: NORMAL
HCG UR QL: 0.2 EU/DL
HGB UR QL STRIP.AUTO: NORMAL
HIV1+2 AB SPEC QL IA.RAPID: NONREACTIVE
KETONES UR-MCNC: NORMAL
LEUKOCYTE ESTERASE UR QL STRIP: NORMAL
NITRITE UR QL STRIP: NORMAL
PH UR STRIP: 6
PROT UR STRIP-MCNC: NORMAL
SP GR UR STRIP: 1.02
T PALLIDUM AB SER QL IA: NEGATIVE

## 2023-08-07 ENCOUNTER — APPOINTMENT (OUTPATIENT)
Dept: MATERNAL FETAL MEDICINE | Facility: CLINIC | Age: 33
End: 2023-08-07
Payer: COMMERCIAL

## 2023-08-07 ENCOUNTER — APPOINTMENT (OUTPATIENT)
Dept: ANTEPARTUM | Facility: CLINIC | Age: 33
End: 2023-08-07

## 2023-08-07 VITALS
DIASTOLIC BLOOD PRESSURE: 70 MMHG | WEIGHT: 213 LBS | HEIGHT: 61 IN | RESPIRATION RATE: 18 BRPM | SYSTOLIC BLOOD PRESSURE: 112 MMHG | OXYGEN SATURATION: 98 % | BODY MASS INDEX: 40.22 KG/M2 | HEART RATE: 74 BPM

## 2023-08-07 DIAGNOSIS — Z87.448 PERSONAL HISTORY OF OTHER DISEASES OF URINARY SYSTEM: ICD-10-CM

## 2023-08-07 PROCEDURE — 99243 OFF/OP CNSLTJ NEW/EST LOW 30: CPT

## 2023-08-07 NOTE — CHIEF COMPLAINT
[G ___] : G [unfilled] [P ___] : P [unfilled] [de-identified] : having fetal growth restriction and diet controlled gestational diabetes

## 2023-08-07 NOTE — DISCUSSION/SUMMARY
[FreeTextEntry1] : Patient is a 33-year-old  at 36 weeks and 6 days gestation with known history of gestational diabetes managed by dietary changes, as well as fetal growth restriction.  -Fetal growth restriction: EFW 4 lb 15 oz on 2023 (3rd percentile), abdominal circumference <1%ile. Reviewed with patient pathophysiology of fetal growth restriction, the fact that in certain cases it is secondary to known process, others it is idiopathic. The patient does not have any known risk factors for fetal growth restriction. The umbilical artery Dopplers have been normal to date. We reviewed risks of FGR including  labor, poor fetal outcomes including intrauterine fetal demise. Due to this timing of delivery is dependent on balancing the benefits of prolonged pregnancy while reducing the risk of intrauterine fetal demise. We reviewed with the patient recommendations for  fetal testing twice a week including umbilical artery dopplers, bpp, NST at time of testing. At this time delivery is recommended between 38 weeks and 0 days gestation to 38 weeks and 6 days gestation ([facility] Consult Series #52, ACOG Committee Opinion 831). Should there be elevations in the umbilical artery Dopplers, recommendation for timing of delivery would need to be revised.  -Gestational diabetes, diet controlled: I have reviewed the patient's glucose logs, fasting glucose values are with normal range, postprandial glucose values are noted to be overall within normal range with one elevation in the last week. Recommend continued fasting and postprandial fingerstick testing.  During labor fingerstick glucose testing every 4 hours is recommended.

## 2023-08-08 ENCOUNTER — NON-APPOINTMENT (OUTPATIENT)
Age: 33
End: 2023-08-08

## 2023-08-08 ENCOUNTER — APPOINTMENT (OUTPATIENT)
Dept: OBGYN | Facility: CLINIC | Age: 33
End: 2023-08-08
Payer: COMMERCIAL

## 2023-08-08 ENCOUNTER — APPOINTMENT (OUTPATIENT)
Dept: ANTEPARTUM | Facility: CLINIC | Age: 33
End: 2023-08-08
Payer: COMMERCIAL

## 2023-08-08 ENCOUNTER — ASOB RESULT (OUTPATIENT)
Age: 33
End: 2023-08-08

## 2023-08-08 VITALS
HEIGHT: 61 IN | SYSTOLIC BLOOD PRESSURE: 112 MMHG | DIASTOLIC BLOOD PRESSURE: 78 MMHG | BODY MASS INDEX: 40.22 KG/M2 | WEIGHT: 213 LBS

## 2023-08-08 PROCEDURE — 93976 VASCULAR STUDY: CPT

## 2023-08-08 PROCEDURE — 59025 FETAL NON-STRESS TEST: CPT | Mod: 59

## 2023-08-08 PROCEDURE — 0502F SUBSEQUENT PRENATAL CARE: CPT

## 2023-08-08 PROCEDURE — 76820 UMBILICAL ARTERY ECHO: CPT

## 2023-08-08 PROCEDURE — 76819 FETAL BIOPHYS PROFIL W/O NST: CPT

## 2023-08-09 ENCOUNTER — NON-APPOINTMENT (OUTPATIENT)
Age: 33
End: 2023-08-09

## 2023-08-11 ENCOUNTER — INPATIENT (INPATIENT)
Facility: HOSPITAL | Age: 33
LOS: 3 days | Discharge: HOME | End: 2023-08-15
Attending: SPECIALIST | Admitting: SPECIALIST
Payer: COMMERCIAL

## 2023-08-11 ENCOUNTER — APPOINTMENT (OUTPATIENT)
Dept: OBGYN | Facility: CLINIC | Age: 33
End: 2023-08-11
Payer: COMMERCIAL

## 2023-08-11 ENCOUNTER — TRANSCRIPTION ENCOUNTER (OUTPATIENT)
Age: 33
End: 2023-08-11

## 2023-08-11 ENCOUNTER — RESULT REVIEW (OUTPATIENT)
Age: 33
End: 2023-08-11

## 2023-08-11 ENCOUNTER — ASOB RESULT (OUTPATIENT)
Age: 33
End: 2023-08-11

## 2023-08-11 ENCOUNTER — APPOINTMENT (OUTPATIENT)
Dept: ANTEPARTUM | Facility: CLINIC | Age: 33
End: 2023-08-11
Payer: COMMERCIAL

## 2023-08-11 VITALS
TEMPERATURE: 98 F | RESPIRATION RATE: 18 BRPM | DIASTOLIC BLOOD PRESSURE: 76 MMHG | HEIGHT: 61 IN | WEIGHT: 212.97 LBS | HEART RATE: 71 BPM | SYSTOLIC BLOOD PRESSURE: 131 MMHG

## 2023-08-11 VITALS
BODY MASS INDEX: 40.22 KG/M2 | SYSTOLIC BLOOD PRESSURE: 118 MMHG | WEIGHT: 213 LBS | HEIGHT: 61 IN | DIASTOLIC BLOOD PRESSURE: 80 MMHG

## 2023-08-11 DIAGNOSIS — O26.893 OTHER SPECIFIED PREGNANCY RELATED CONDITIONS, THIRD TRIMESTER: ICD-10-CM

## 2023-08-11 DIAGNOSIS — O26.899 OTHER SPECIFIED PREGNANCY RELATED CONDITIONS, UNSPECIFIED TRIMESTER: ICD-10-CM

## 2023-08-11 LAB
BASOPHILS # BLD AUTO: 0.04 K/UL — SIGNIFICANT CHANGE UP (ref 0–0.2)
BASOPHILS NFR BLD AUTO: 0.4 % — SIGNIFICANT CHANGE UP (ref 0–2)
BLD GP AB SCN SERPL QL: SIGNIFICANT CHANGE UP
EOSINOPHIL # BLD AUTO: 0.09 K/UL — SIGNIFICANT CHANGE UP (ref 0–0.5)
EOSINOPHIL NFR BLD AUTO: 0.8 % — SIGNIFICANT CHANGE UP (ref 0–6)
GLUCOSE BLDC GLUCOMTR-MCNC: 106 MG/DL — HIGH (ref 70–99)
GLUCOSE BLDC GLUCOMTR-MCNC: 69 MG/DL — LOW (ref 70–99)
GLUCOSE BLDC GLUCOMTR-MCNC: 88 MG/DL — SIGNIFICANT CHANGE UP (ref 70–99)
HCT VFR BLD CALC: 35.3 % — SIGNIFICANT CHANGE UP (ref 34.5–45)
HGB BLD-MCNC: 11.7 G/DL — SIGNIFICANT CHANGE UP (ref 11.5–15.5)
IMM GRANULOCYTES NFR BLD AUTO: 0.6 % — SIGNIFICANT CHANGE UP (ref 0–0.9)
LYMPHOCYTES # BLD AUTO: 2.7 K/UL — SIGNIFICANT CHANGE UP (ref 1–3.3)
LYMPHOCYTES # BLD AUTO: 24.2 % — SIGNIFICANT CHANGE UP (ref 13–44)
MCHC RBC-ENTMCNC: 28.1 PG — SIGNIFICANT CHANGE UP (ref 27–34)
MCHC RBC-ENTMCNC: 33.1 GM/DL — SIGNIFICANT CHANGE UP (ref 32–36)
MCV RBC AUTO: 84.7 FL — SIGNIFICANT CHANGE UP (ref 80–100)
MONOCYTES # BLD AUTO: 0.67 K/UL — SIGNIFICANT CHANGE UP (ref 0–0.9)
MONOCYTES NFR BLD AUTO: 6 % — SIGNIFICANT CHANGE UP (ref 2–14)
NEUTROPHILS # BLD AUTO: 7.57 K/UL — HIGH (ref 1.8–7.4)
NEUTROPHILS NFR BLD AUTO: 68 % — SIGNIFICANT CHANGE UP (ref 43–77)
PLATELET # BLD AUTO: 246 K/UL — SIGNIFICANT CHANGE UP (ref 150–400)
RBC # BLD: 4.17 M/UL — SIGNIFICANT CHANGE UP (ref 3.8–5.2)
RBC # FLD: 12.8 % — SIGNIFICANT CHANGE UP (ref 10.3–14.5)
WBC # BLD: 11.14 K/UL — HIGH (ref 3.8–10.5)
WBC # FLD AUTO: 11.14 K/UL — HIGH (ref 3.8–10.5)

## 2023-08-11 PROCEDURE — 0502F SUBSEQUENT PRENATAL CARE: CPT

## 2023-08-11 PROCEDURE — 76819 FETAL BIOPHYS PROFIL W/O NST: CPT

## 2023-08-11 PROCEDURE — 88307 TISSUE EXAM BY PATHOLOGIST: CPT | Mod: 26

## 2023-08-11 PROCEDURE — 93976 VASCULAR STUDY: CPT

## 2023-08-11 PROCEDURE — 76820 UMBILICAL ARTERY ECHO: CPT

## 2023-08-11 RX ORDER — CITRIC ACID/SODIUM CITRATE 300-500 MG
30 SOLUTION, ORAL ORAL ONCE
Refills: 0 | Status: COMPLETED | OUTPATIENT
Start: 2023-08-11 | End: 2023-08-12

## 2023-08-11 RX ORDER — SODIUM CHLORIDE 9 MG/ML
1000 INJECTION, SOLUTION INTRAVENOUS
Refills: 0 | Status: DISCONTINUED | OUTPATIENT
Start: 2023-08-11 | End: 2023-08-15

## 2023-08-11 RX ORDER — CHLORHEXIDINE GLUCONATE 213 G/1000ML
1 SOLUTION TOPICAL DAILY
Refills: 0 | Status: DISCONTINUED | OUTPATIENT
Start: 2023-08-11 | End: 2023-08-12

## 2023-08-11 RX ORDER — OXYTOCIN 10 UNIT/ML
333.33 VIAL (ML) INJECTION
Qty: 20 | Refills: 0 | Status: DISCONTINUED | OUTPATIENT
Start: 2023-08-11 | End: 2023-08-15

## 2023-08-11 RX ORDER — SODIUM CHLORIDE 9 MG/ML
1000 INJECTION, SOLUTION INTRAVENOUS
Refills: 0 | Status: DISCONTINUED | OUTPATIENT
Start: 2023-08-11 | End: 2023-08-12

## 2023-08-11 NOTE — OB PROVIDER H&P - NS_FINALEDD_OBGYN_ALL_OB_DT
Instructed patient/caregiver to follow-up with primary care physician./Instructed patient/caregiver regarding signs and symptoms of infection./Verbal/written post procedure instructions were given to patient/caregiver. 29-Aug-2023

## 2023-08-11 NOTE — OB PROVIDER H&P - NSHPPHYSICALEXAM_GEN_ALL_CORE
OBJECTIVE:  T(C): 36.9 (08-11-23 @ 17:26), Max: 36.9 (08-11-23 @ 17:09)  HR: 71 (08-11-23 @ 17:27) (71 - 71)  BP: 131/76 (08-11-23 @ 17:27) (131/76 - 131/76)  RR: 18 (08-11-23 @ 17:26) (18 - 18)    Physical Exam:  Gen: NAD, well-appearing, AAOx3   Abd: Soft, gravid  Ext: non-tender, non-edematous  SVE:    Bedside sono: Vertex, placenta anterior  FHT:   [place]:

## 2023-08-11 NOTE — OB RN PATIENT PROFILE - FALL HARM RISK - UNIVERSAL INTERVENTIONS
Bed in lowest position, wheels locked, appropriate side rails in place/Call bell, personal items and telephone in reach/Instruct patient to call for assistance before getting out of bed or chair/Non-slip footwear when patient is out of bed/[place] to call system/Physically safe environment - no spills, clutter or unnecessary equipment/Purposeful Proactive Rounding/Room/bathroom lighting operational, light cord in reach

## 2023-08-11 NOTE — OB PROVIDER H&P - HISTORY OF PRESENT ILLNESS
NOTE TO BE COMPLETED  SUBJECTIVE:  ROMEO WICK is a 33y  at 37wk3d GA by LMP here for IOL for hx abnormal UAD, FGR.  Patient denies vaginal bleeding, contractions and leakage of fluid. She endorses fetal movement.   Denies fevers, chills, nausea, vomiting, chest pain, SOB, dizziness and headache. No other complaints at this time.     Patient receives care at   HIL[facility]: _  LMP: _    Prenatal course is significant for:  GDMA1  Abnormal Uterine Dopplers  FGR    GBS Status: neg    OBHx:   GYNHx: Denies history of fibroids, ovarian cysts, STIs, abnormal pap smears   PMHx: Denies  PSHx: Denies  SocHx: Denies EtOH, tobacco and illicit drug use during this pregnancy; feels safe at home   Meds: PNVs  Allergies: NKDA    BMI:  Sono:  EFW:    NOTE TO BE COMPLETED  SUBJECTIVE:  ROMEO WICK is a 33y  at 37wk3d GA by LMP here for IOL for hx abnormal UAD, FGR.  Patient denies vaginal bleeding, contractions and leakage of fluid. She endorses fetal movement.   Denies fevers, chills, nausea, vomiting, chest pain, SOB, dizziness and headache. No other complaints at this time.     Patient receives care with Dr. Carrington  [facility]:   LMP: _    Prenatal course is significant for:  GDMA1  Abnormal Uterine Dopplers  FGR    GBS Status: neg    OBHx:   GYNHx: Denies history of fibroids, ovarian cysts, STIs, abnormal pap smears   PMHx: Denies  PSHx: Denies  SocHx: Denies EtOH, tobacco and illicit drug use during this pregnancy; feels safe at home   Meds: PNVs  Allergies: NKDA    BMI:  Sono:  EFW:    SUBJECTIVE:  ROMEO WICK is a 33y  at 37wk3d GA by LMP here for IOL for hx abnormal UAD, FGR.  Patient denies vaginal bleeding, contractions and leakage of fluid. She endorses fetal movement.   Denies fevers, chills, nausea, vomiting, chest pain, SOB, dizziness and headache. No other complaints at this time.     Patient receives care with Dr. Carrington  HILTON: 23  LMP: 22    Prenatal course is significant for:  GDMA1  Abnormal Uterine Dopplers  FGR    GBS Status: neg    OBHx:   GYNHx: Denies history of fibroids, ovarian cysts, STIs, abnormal pap smears   PMHx: Childhood asthma  PSHx: Denies  SocHx: Denies EtOH, tobacco and illicit drug use during this pregnancy; feels safe at home   Meds: PNVs  Allergies: NKDA    BMI: 40.3  Sono: Vertex, placenta anterior  EFW: 2500

## 2023-08-11 NOTE — OB PROVIDER H&P - ASSESSMENT
NOTE TO BE COMPLETED  A/P: ROMEO WICK is a 33y  at 37wk3d here for IOL for abnormal UAD and FGR, pregnancy also pertinent for GDMA1    - Admit to L&D  - POC FS q4 hours, D5 repletion orders  - Consent  - Admission labs ordered  - IV fluids  - Fetus: Cat I tracing. Continuous toco and fetal monitoring.   - GBS: Negative      Discussed with Dr. De Jesus A/P: ROMEO WICK is a 33y  at 37wk3d here for IOL for abnormal UAD and FGR, pregnancy also pertinent for GDMA1    - Admit to L&D  - POC FS q4 hours, D5 repletion orders  - Consent  - Admission labs ordered  - IV fluids  - Fetus: Cat I tracing. Continuous toco and fetal monitoring.   - GBS: Negative    Discussed with Dr. De Jesus

## 2023-08-12 ENCOUNTER — TRANSCRIPTION ENCOUNTER (OUTPATIENT)
Age: 33
End: 2023-08-12

## 2023-08-12 LAB
GLUCOSE BLDC GLUCOMTR-MCNC: 124 MG/DL — HIGH (ref 70–99)
GLUCOSE BLDC GLUCOMTR-MCNC: 124 MG/DL — HIGH (ref 70–99)
GLUCOSE BLDC GLUCOMTR-MCNC: 83 MG/DL — SIGNIFICANT CHANGE UP (ref 70–99)
GLUCOSE BLDC GLUCOMTR-MCNC: 86 MG/DL — SIGNIFICANT CHANGE UP (ref 70–99)
GLUCOSE BLDC GLUCOMTR-MCNC: 87 MG/DL — SIGNIFICANT CHANGE UP (ref 70–99)
GLUCOSE BLDC GLUCOMTR-MCNC: 88 MG/DL — SIGNIFICANT CHANGE UP (ref 70–99)
T PALLIDUM AB TITR SER: NEGATIVE — SIGNIFICANT CHANGE UP

## 2023-08-12 PROCEDURE — 59514 CESAREAN DELIVERY ONLY: CPT

## 2023-08-12 RX ORDER — OXYCODONE HYDROCHLORIDE 5 MG/1
5 TABLET ORAL
Refills: 0 | Status: DISCONTINUED | OUTPATIENT
Start: 2023-08-12 | End: 2023-08-15

## 2023-08-12 RX ORDER — CEFAZOLIN SODIUM 1 G
2000 VIAL (EA) INJECTION ONCE
Refills: 0 | Status: COMPLETED | OUTPATIENT
Start: 2023-08-12 | End: 2023-08-12

## 2023-08-12 RX ORDER — ACETAMINOPHEN 500 MG
975 TABLET ORAL
Refills: 0 | Status: DISCONTINUED | OUTPATIENT
Start: 2023-08-12 | End: 2023-08-13

## 2023-08-12 RX ORDER — ACETAMINOPHEN 500 MG
975 TABLET ORAL ONCE
Refills: 0 | Status: COMPLETED | OUTPATIENT
Start: 2023-08-12 | End: 2023-08-12

## 2023-08-12 RX ORDER — SCOPALAMINE 1 MG/3D
1 PATCH, EXTENDED RELEASE TRANSDERMAL ONCE
Refills: 0 | Status: COMPLETED | OUTPATIENT
Start: 2023-08-12 | End: 2023-08-12

## 2023-08-12 RX ORDER — TETANUS TOXOID, REDUCED DIPHTHERIA TOXOID AND ACELLULAR PERTUSSIS VACCINE, ADSORBED 5; 2.5; 8; 8; 2.5 [IU]/.5ML; [IU]/.5ML; UG/.5ML; UG/.5ML; UG/.5ML
0.5 SUSPENSION INTRAMUSCULAR ONCE
Refills: 0 | Status: DISCONTINUED | OUTPATIENT
Start: 2023-08-12 | End: 2023-08-15

## 2023-08-12 RX ORDER — ENOXAPARIN SODIUM 100 MG/ML
60 INJECTION SUBCUTANEOUS EVERY 24 HOURS
Refills: 0 | Status: DISCONTINUED | OUTPATIENT
Start: 2023-08-13 | End: 2023-08-15

## 2023-08-12 RX ORDER — SIMETHICONE 80 MG/1
80 TABLET, CHEWABLE ORAL EVERY 4 HOURS
Refills: 0 | Status: DISCONTINUED | OUTPATIENT
Start: 2023-08-12 | End: 2023-08-15

## 2023-08-12 RX ORDER — OXYCODONE HYDROCHLORIDE 5 MG/1
5 TABLET ORAL ONCE
Refills: 0 | Status: DISCONTINUED | OUTPATIENT
Start: 2023-08-12 | End: 2023-08-15

## 2023-08-12 RX ORDER — DEXAMETHASONE 0.5 MG/5ML
4 ELIXIR ORAL EVERY 6 HOURS
Refills: 0 | Status: DISCONTINUED | OUTPATIENT
Start: 2023-08-12 | End: 2023-08-15

## 2023-08-12 RX ORDER — IBUPROFEN 200 MG
600 TABLET ORAL EVERY 6 HOURS
Refills: 0 | Status: DISCONTINUED | OUTPATIENT
Start: 2023-08-12 | End: 2023-08-13

## 2023-08-12 RX ORDER — DIPHENHYDRAMINE HCL 50 MG
25 CAPSULE ORAL EVERY 6 HOURS
Refills: 0 | Status: DISCONTINUED | OUTPATIENT
Start: 2023-08-12 | End: 2023-08-15

## 2023-08-12 RX ORDER — SODIUM CHLORIDE 9 MG/ML
1000 INJECTION, SOLUTION INTRAVENOUS
Refills: 0 | Status: DISCONTINUED | OUTPATIENT
Start: 2023-08-12 | End: 2023-08-15

## 2023-08-12 RX ORDER — LANOLIN
1 OINTMENT (GRAM) TOPICAL EVERY 6 HOURS
Refills: 0 | Status: DISCONTINUED | OUTPATIENT
Start: 2023-08-12 | End: 2023-08-15

## 2023-08-12 RX ORDER — OXYTOCIN 10 UNIT/ML
333.33 VIAL (ML) INJECTION
Qty: 20 | Refills: 0 | Status: DISCONTINUED | OUTPATIENT
Start: 2023-08-12 | End: 2023-08-12

## 2023-08-12 RX ORDER — NALOXONE HYDROCHLORIDE 4 MG/.1ML
0.1 SPRAY NASAL
Refills: 0 | Status: DISCONTINUED | OUTPATIENT
Start: 2023-08-12 | End: 2023-08-15

## 2023-08-12 RX ORDER — MAGNESIUM HYDROXIDE 400 MG/1
30 TABLET, CHEWABLE ORAL
Refills: 0 | Status: DISCONTINUED | OUTPATIENT
Start: 2023-08-12 | End: 2023-08-15

## 2023-08-12 RX ORDER — SODIUM CHLORIDE 9 MG/ML
1000 INJECTION, SOLUTION INTRAVENOUS ONCE
Refills: 0 | Status: COMPLETED | OUTPATIENT
Start: 2023-08-12 | End: 2023-08-12

## 2023-08-12 RX ORDER — AZITHROMYCIN 500 MG/1
500 TABLET, FILM COATED ORAL ONCE
Refills: 0 | Status: COMPLETED | OUTPATIENT
Start: 2023-08-12 | End: 2023-08-12

## 2023-08-12 RX ORDER — KETOROLAC TROMETHAMINE 30 MG/ML
30 SYRINGE (ML) INJECTION EVERY 6 HOURS
Refills: 0 | Status: DISCONTINUED | OUTPATIENT
Start: 2023-08-12 | End: 2023-08-13

## 2023-08-12 RX ORDER — ONDANSETRON 8 MG/1
4 TABLET, FILM COATED ORAL EVERY 6 HOURS
Refills: 0 | Status: DISCONTINUED | OUTPATIENT
Start: 2023-08-12 | End: 2023-08-15

## 2023-08-12 RX ORDER — OXYTOCIN 10 UNIT/ML
VIAL (ML) INJECTION
Qty: 30 | Refills: 0 | Status: DISCONTINUED | OUTPATIENT
Start: 2023-08-12 | End: 2023-08-12

## 2023-08-12 RX ADMIN — SODIUM CHLORIDE 125 MILLILITER(S): 9 INJECTION, SOLUTION INTRAVENOUS at 17:50

## 2023-08-12 RX ADMIN — SODIUM CHLORIDE 125 MILLILITER(S): 9 INJECTION, SOLUTION INTRAVENOUS at 17:52

## 2023-08-12 RX ADMIN — SODIUM CHLORIDE 1000 MILLILITER(S): 9 INJECTION, SOLUTION INTRAVENOUS at 02:39

## 2023-08-12 RX ADMIN — SODIUM CHLORIDE 125 MILLILITER(S): 9 INJECTION, SOLUTION INTRAVENOUS at 03:51

## 2023-08-12 RX ADMIN — Medication 2 MILLIUNIT(S)/MIN: at 17:49

## 2023-08-12 RX ADMIN — AZITHROMYCIN 255 MILLIGRAM(S): 500 TABLET, FILM COATED ORAL at 19:50

## 2023-08-12 RX ADMIN — SCOPALAMINE 1 PATCH: 1 PATCH, EXTENDED RELEASE TRANSDERMAL at 19:32

## 2023-08-12 RX ADMIN — Medication 30 MILLILITER(S): at 19:32

## 2023-08-12 RX ADMIN — Medication 1000 MILLIUNIT(S)/MIN: at 20:17

## 2023-08-12 RX ADMIN — Medication 2000 MILLIGRAM(S): at 19:50

## 2023-08-12 NOTE — OB PROVIDER DELIVERY SUMMARY - NSSELHIDDEN_OBGYN_ALL_OB_FT
[NS_DeliveryAttending1_OBGYN_ALL_OB_FT:[id number]],[NS_DeliveryAssist2_OBGYN_ALL_OB_FT:[id number]],[NS_DeliveryRN_OBGYN_ALL_OB_FT:[id number]]

## 2023-08-12 NOTE — OB PROVIDER DELIVERY SUMMARY - NSCOUNTCORRECT_OBGYN_ALL_OB
Assessment and Plan:  Based on a thorough discussion of this condition and the management approach to it (including a comprehensive discussion of the known risks, side effects and potential benefits of treatment), the patient (family) agrees to implement the following specific plan:   Discussed laser therapy    Spider Telangiectasis  A spider telangiectasis (plural: telangiectases) is composed of dilated blood vessels, and is clinically characterised by its spider-like appearance  It is given that name because it has a central red papule (the body of the 'spider') from which fine red lines (the spider legs) extend radially  An alternative explanation for the name explains that the red lines form a spider-like network  Other names for spider telangiectasis are spider angioma (a misnomer), spider naevus and naevus araneus  A solitary spider telangiectasis is common in children and adults, affecting 10-15% of the population  Although they can affect people of any race, spider telangiectases are more easily seen in fair skin compared to skin of colour  Multiple spider telangiectases arise most frequently in pregnancy, in women taking a combined oral contraceptive pill, in patients with liver disease (particularly, in cirrhosis due to alcohol abuse), and in those with thyrotoxicosis  What causes a spider telangiectasis? Spider telangiectasis is an acquired vascular malformation  It occurs because of the failure of a tiny muscle restricting the size of an arteriole  Increased pulsating flow through the vessel (the central papule) results in the dilatation of distal vessels (the red lines)  Spider telangiectasis may arise spontaneously or may be induced by circulating oestrogen, which is increased in pregnancy, women taking combined oral contraceptives, and in those with liver disease  Various vascular endothelial growth factors may be involved      Spider telangiectases are often located on the face, neck, and upper chest (this has been postulated to relate to the distribution of a large vein draining the heart, the superior vena cava)  Spider telangiectases may also occur on the hands, arms, or other sites  Spider telangiectases vary in size and number, tending to be larger and more numerous in people with severe liver disease when other cutaneous signs of liver disease may be present such as palmar erythema, leukonychia, and jaundice  A central dilated arteriole may be present without radial capillaries, and the capillaries may vary in diameter, length, and number  They can also be star-shaped  The lesions may briefly bleed on trauma but otherwise do not cause any symptoms or complications  A spider telangiectasis is diagnosed by its typical appearance  Compression of the central arteriole results in the disappearance of the radial capillaries, which rapidly refill when the compression is relieved  This is best seen through a transparent object, such as a glass slide or the lens of a contact dermatoscope  Spider telangiectases are distinct from 'spider veins', which are blue-coloured dilated venules arising on the thighs and lower legs and often associated with varicose veins  What is the treatment for a spider telangiectasis? A spider telangiectasis is harmless and does not require treatment  A lesion that is unsightly can be removed by destroying the feeding central arteriole, but this may result in a small scar  Treatments to remove spider telangiectasis include:   Cryotherapy   Electrocautery   Intense pulsed light   Vascular laser  Surgical excision is rarely necessary and inevitably leaves a scar  Spider telangiectases can persist or disappear  In women, oestrogen-induced telangiectases often disappear within 6 months after having a baby or of stopping the combined contraceptive pill  They can also reappear after initially successful treatment      Assessment and Plan:  Based on a thorough discussion of this condition and the management approach to it (including a comprehensive discussion of the known risks, side effects and potential benefits of treatment), the patient (family) agrees to implement the following specific plan:   Reassure benign   Monitor for change     Seborrheic Keratosis  A seborrheic keratosis is a harmless warty spot that appears during adult life as a common sign of skin aging  Seborrheic keratoses can arise on any area of skin, covered or uncovered, with the usual exception of the palms and soles  They do not arise from mucous membranes  Seborrheic keratoses can have highly variable appearance  Seborrheic keratoses are extremely common  It has been estimated that over 90% of adults over the age of 61 years have one or more of them  They occur in males and females of all races, typically beginning to erupt in the 35s or 45s  They are uncommon under the age of 21 years  The precise cause of seborrhoeic keratoses is not known  Seborrhoeic keratoses are considered degenerative in nature  As time goes by, seborrheic keratoses tend to become more numerous  Some people inherit a tendency to develop a very large number of them; some people may have hundreds of them  The name "seborrheic keratosis" is misleading, because these lesions are not limited to a seborrhoeic distribution (scalp, mid-face, chest, upper back), nor are they formed from sebaceous glands, nor are they associated with sebum -- which is greasy    Seborrheic keratosis may also be called "SK," "Seb K," "basal cell papilloma," "senile wart," or "barnacle "      Researchers have noted:   Eruptive seborrhoeic keratoses can follow sunburn or dermatitis   Skin friction may be the reason they appear in body folds   Viral cause (e g , human papillomavirus) seems unlikely   Stable and clonal mutations or activation of FRFR3, PIK3CA, ESTELLE, AKT1 and EGFR genes are found in seborrhoeic keratoses   Seborrhoeic keratosis can arise from solar lentigo   FRFR3 mutations also arise in solar lentigines  These mutations are associated with increased age and location on the head and neck, suggesting a role of ultraviolet radiation in these lesions   Seborrheic keratoses do not harbour tumour suppressor gene mutations   Epidermal growth factor receptor inhibitors, which are used to treat some cancers, often result in an increase in verrucal (warty) keratoses  There is no easy way to remove multiple lesions on a single occasion  Unless a specific lesion is "inflamed" and is causing pain or stinging/burning or is bleeding, most insurance companies do not authorize treatment  Assessment and Plan:  Based on a thorough discussion of this condition and the management approach to it (including a comprehensive discussion of the known risks, side effects and potential benefits of treatment), the patient (family) agrees to implement the following specific plan:   Reassure benign     Assessment and Plan:    Cherry angioma, also known as Delene Mas spots, are benign vascular skin lesions  A "cherry angioma" is a firm red, blue or purple papule, 0 1-1 cm in diameter  When thrombosed, they can appear black in colour until evaluated with a dermatoscope when the red or purple colour is more easily seen  Cherry angioma may develop on any part of the body but most often appear on the scalp, face, lips and trunk  An angioma is due to proliferating endothelial cells; these are the cells that line the inside of a blood vessel  Angiomas can arise in early life or later in life; the most common type of angioma is a cherry angioma  Cherry angiomas are very common in males and females of any age or race  They are more noticeable in white skin than in skin of colour  They markedly increase in number from about the age of 36  There may be a family history of similar lesions   Eruptive cherry angiomas have been rarely reported to be associated with internal malignancy  The cause of angiomas is unknown  Genetic analysis of cherry angiomas has shown that they frequently carry specific somatic missense mutations in the GNAQ and GNA11 (Q209H) genes, which are involved in other vascular and melanocytic proliferations  Cherry angioma is usually diagnosed clinically and no investigations are necessary for the majority of lesions  It has a characteristic red-clod or lobular pattern on dermatoscopy (called lacunar pattern using conventional pattern analysis)  When there is uncertainty about the diagnosis, a biopsy may be performed  The angioma is composed of venules in a thickened papillary dermis  Collagen bundles may be prominent between the lobules  Cherry angiomas are harmless, so they do not usually have to be treated  Occasionally, they are removed to exclude a malignant skin lesion such as a nodular melanoma or because they are irritated or bleeding (and a subsequent risk for infection)  To decrease friction over the lesions, we recommend Neutrogena Daily Defense SPF 50+ at least 3 times a day  Assessment and Plan:  Based on a thorough discussion of this condition and the management approach to it (including a comprehensive discussion of the known risks, side effects and potential benefits of treatment), the patient (family) agrees to implement the following specific plan:   Start Neutrogena Daily Defense SPF 50+ 2-3 times a day      Melanocytic Nevi  Melanocytic nevi ("moles") are tan or brown, raised or flat areas of the skin which have an increased number of melanocytes  Melanocytes are the cells in our body which make pigment and account for skin color  Some moles are present at birth (I e , "congenital nevi"), while others come up later in life (i e , "acquired nevi")  The sun can stimulate the body to make more moles  Sunburns are not the only thing that triggers more moles  Chronic sun exposure can do it too  Clinically distinguishing a healthy mole from melanoma may be difficult, even for experienced dermatologists  The "ABCDE's" of moles have been suggested as a means of helping to alert a person to a suspicious mole and the possible increased risk of melanoma  The suggestions for raising alert are as follows:    Asymmetry: Healthy moles tend to be symmetric, while melanomas are often asymmetric  Asymmetry means if you draw a line through the mole, the two halves do not match in color, size, shape, or surface texture  Asymmetry can be a result of rapid enlargement of a mole, the development of a raised area on a previously flat lesion, scaling, ulceration, bleeding or scabbing within the mole  Any mole that starts to demonstrate "asymmetry" should be examined promptly by a board certified dermatologist      Border: Healthy moles tend to have discrete, even borders  The border of a melanoma often blends into the normal skin and does not sharply delineate the mole from normal skin  Any mole that starts to demonstrate "uneven borders" should be examined promptly by a board certified dermatologist      Color: Healthy moles tend to be one color throughout  Melanomas tend to be made up of different colors ranging from dark black, blue, white, or red  Any mole that demonstrates a color change should be examined promptly by a board certified dermatologist      Diameter: Healthy moles tend to be smaller than 0 6 cm in size; an exception are "congenital nevi" that can be larger  Melanomas tend to grow and can often be greater than 0 6 cm (1/4 of an inch, or the size of a pencil eraser)  This is only a guideline, and many normal moles may be larger than 0 6 cm without being unhealthy    Any mole that starts to change in size (small to bigger or bigger to smaller) should be examined promptly by a board certified dermatologist      Evolving: Healthy moles tend to "stay the same "  Melanomas may often show signs of change or evolution such as a change in size, shape, color, or elevation  Any mole that starts to itch, bleed, crust, burn, hurt, or ulcerate or demonstrate a change or evolution should be examined promptly by a board certified dermatologist       Dysplastic Nevi  Dysplastic moles are moles that fit the ABCDE rules of melanoma but are not identified as melanomas when examined under the microscope  They may indicate an increased risk of melanoma in that person  If there is a family history of melanoma, most experts agree that the person may be at an increased risk for developing a melanoma  Experts still do not agree on what dysplastic moles mean in patients without a personal or family history of melanoma  Dysplastic moles are usually larger than common moles and have different colors within it with irregular borders  The appearance can be very similar to a melanoma  Biopsies of dysplastic moles may show abnormalities which are different from a regular mole  Melanoma  Malignant melanoma is a type of skin cancer that can be deadly if it spreads throughout the body  The incidence of melanoma in the [place] is growing faster than any other cancer  Melanoma usually grows near the surface of the skin for a period of time, and then begins to grow deeper into the skin  Once it grows deeper into the skin, the risk of spread to other organs greatly increases  Therefore, early detection and removal of a malignant melanoma may result in a better chance at a complete cure; removal after the tumor has spread may not be as effective, leading to worse clinical outcomes such as death  The true rate of nevus transformation into a melanoma is unknown  It has been estimated that the lifetime risk for any acquired melanocytic nevus on any 21year-old individual transforming into melanoma by age [de-identified] is 0 03% (1 in 3,164) for men and 0 009% (1 in 10,800) for women       The appearance of a "new mole" remains one of the most reliable methods for identifying a malignant melanoma  Occasionally, melanomas appear as rapidly growing, blue-black, dome-shaped bumps within a previous mole or previous area of normal skin  Other times, melanomas are suspected when a mole suddenly appears or changes  Itching, burning, or pain in a pigmented lesion should increase suspicion, but most patients with early melanoma have no skin discomfort whatsoever  Melanoma can occur anywhere on the skin, including areas that are difficult for self-examination  Many melanomas are first noticed by other family members  Suspicious-looking moles may be removed for microscopic examination  You may be able to prevent death from melanoma by doing two simple things:    1  Try to avoid unnecessary sun exposure and protect your skin when it is exposed to the sun  People who live near the equator, people who have intermittent exposures to large amounts of sun, and people who have had sunburns in childhood or adolescence have an increased risk for melanoma  Sun sense and vigilant sun protection may be keys to helping to prevent melanoma  We recommend wearing UPF-rated sun protective clothing and sunglasses whenever possible and applying a moisturizer-sunscreen combination product (SPF 50+) such as Neutrogena Daily Defense to sun exposed areas of skin at least three times a day  2  Have your moles regularly examined by a board certified dermatologist AND by yourself or a family member/friend at home  We recommend that you have your moles examined at least once a year by a board certified dermatologist   Use your birthday as an annual reminder to have your "Birthday Suit" (I e , your skin) examined; it is a nice birthday gift to yourself to know that your skin is healthy appearing! Additionally, at-home self examinations may be helpful for detecting a possible melanoma  Use the ABCDEs we discussed and check your moles once a month at home  Laps, needles and instruments count was reported as correct.

## 2023-08-12 NOTE — OB RN DELIVERY SUMMARY - NS_SEPSISRSKCALC_OBGYN_ALL_OB_FT
EOS calculated successfully. EOS Risk Factor: 0.5/1000 live births ([facility] national incidence); GA=37w4d; Temp=98.6; ROM=18.283; GBS='Negative'; Antibiotics='No antibiotics or any antibiotics < 2 hrs prior to birth'

## 2023-08-12 NOTE — OB PROVIDER LABOR PROGRESS NOTE - NS_SUBJECTIVE/OBJECTIVE_OBGYN_ALL_OB_FT
patient reevaluated in setting of variable decelerations starting again approximately 4:40  examined  FSE, IUPC placed

## 2023-08-12 NOTE — OB PROVIDER LABOR PROGRESS NOTE - NS_OBIHIFHRDETAILS_OBGYN_ALL_OB_FT
150bpm baseline, moderate variability, +accels, 1 isolated decel
baseline 135 mod variability. + accels, 1 variable decel seen
baseline 150, minimal variability, no accels, late decelerations
baseline 145, moderate variability, no accels, variable and late decelerations
125bpm baseline, moderate variability, +accels, 1 isolated decel
baseline 150bpm, mod variability, + accels -decels

## 2023-08-12 NOTE — OB PROVIDER DELIVERY SUMMARY - NSPROVIDERDELIVERYNOTE_OBGYN_ALL_OB_FT
Pt taken to the OR for primary C/S due to NRFHT during second stage of labor.  Epidural anesthesia.  Low transverse  section was performed.  Delivered live male infant, vtx, through moderate amount of clear amniotic fluid.  No nuchal cord.  Uterus with posterior and fundal fibroids, tubes, ovaries WNL.   Hysterotomy was reapproximated with suture, excellent hemostasis obtained.  Fascia was reapproximated with suture, excellent hemostasis obtained.  Subcutaneous fat was reapproximated with suture.   Skin closed with monocryl     weight 2430  Skin-to-skin initiated in OR and continued into RR.  APGAR 8/9.   QBL: 473  Uop: 50  No intraoperative complications

## 2023-08-12 NOTE — OB RN INTRAOPERATIVE NOTE - NSSELHIDDEN_OBGYN_ALL_OB_FT
[NS_DeliveryAttending1_OBGYN_ALL_OB_FT:[id number]],[NS_DeliveryAssist2_OBGYN_ALL_OB_FT:[id number]],[NS_DeliveryRN_OBGYN_ALL_OB_FT:[id number]=]

## 2023-08-12 NOTE — OB PROVIDER LABOR PROGRESS NOTE - ASSESSMENT
32 yo  here for IOL for FGR, abnormal UAD, GDMA1    - Patient exam as above  - VCyto #2 placed  - Reevaluate in 3 hours
Cat II tracing, reeval at 0430AM showing patient resuscitated after following measures: repositioning into high fowlers, IV fluid bolus, oxygen  BPs normotensive after epidural  progressing in labor  will reevaluate tracing and clinical picture for possible augmentation    discussed with attending Dr De Jesus at bedside
32 yo  here for IOL for FGR, abnormal UAD, GDMA1    - Patient exam as above  - VCyto placed  - Reevaluate in 3 hours
Cat 1 tracing. Continue IUPC and FSE  s/p amnioinfusion  Progressing in labor with cervical dilation
Cat II tracing - resuscitative measures ongoing  progressing in labor  FSE, IUPC in place - primarily late decelerations. however with variables, will possibly amnioinfuse  contractions q1-3 minutes  will reevaluate clinical picture within 30 minutes    discussed with attending Dr De Jesus
Cat 1 tracing  Fully dilated. Will start pushing with pt

## 2023-08-12 NOTE — OB RN DELIVERY SUMMARY - NS_LABORCHARACTER_OBGYN_ALL_OB
Induction of labor-Medicinal/Internal electronic FM/External electronic FM/Antibiotics in labor/Fetal intolerance

## 2023-08-12 NOTE — OB PROVIDER LABOR PROGRESS NOTE - NS_SUBJECTIVE/OBJECTIVE_OBGYN_ALL_OB_FT
patient re-examined, c/o pressure. patient re-examined, c/o pressure.  isolated /85 due to pt lying on the BP cuff. Repeat 124/71

## 2023-08-12 NOTE — OB PROVIDER LABOR PROGRESS NOTE - NS_SUBJECTIVE/OBJECTIVE_OBGYN_ALL_OB_FT
late entry note  patient evaluated initially at 0339 after deep variable and late decelerations immeduately post-epidural

## 2023-08-13 LAB
BASOPHILS # BLD AUTO: 0.04 K/UL — SIGNIFICANT CHANGE UP (ref 0–0.2)
BASOPHILS NFR BLD AUTO: 0.3 % — SIGNIFICANT CHANGE UP (ref 0–2)
EOSINOPHIL # BLD AUTO: 0.02 K/UL — SIGNIFICANT CHANGE UP (ref 0–0.5)
EOSINOPHIL NFR BLD AUTO: 0.1 % — SIGNIFICANT CHANGE UP (ref 0–6)
HCT VFR BLD CALC: 29.4 % — LOW (ref 34.5–45)
HGB BLD-MCNC: 9.7 G/DL — LOW (ref 11.5–15.5)
IMM GRANULOCYTES NFR BLD AUTO: 0.5 % — SIGNIFICANT CHANGE UP (ref 0–0.9)
LYMPHOCYTES # BLD AUTO: 1.09 K/UL — SIGNIFICANT CHANGE UP (ref 1–3.3)
LYMPHOCYTES # BLD AUTO: 7.3 % — LOW (ref 13–44)
MCHC RBC-ENTMCNC: 28.4 PG — SIGNIFICANT CHANGE UP (ref 27–34)
MCHC RBC-ENTMCNC: 33 GM/DL — SIGNIFICANT CHANGE UP (ref 32–36)
MCV RBC AUTO: 86 FL — SIGNIFICANT CHANGE UP (ref 80–100)
MONOCYTES # BLD AUTO: 0.77 K/UL — SIGNIFICANT CHANGE UP (ref 0–0.9)
MONOCYTES NFR BLD AUTO: 5.2 % — SIGNIFICANT CHANGE UP (ref 2–14)
NEUTROPHILS # BLD AUTO: 12.88 K/UL — HIGH (ref 1.8–7.4)
NEUTROPHILS NFR BLD AUTO: 86.6 % — HIGH (ref 43–77)
PLATELET # BLD AUTO: 163 K/UL — SIGNIFICANT CHANGE UP (ref 150–400)
RBC # BLD: 3.42 M/UL — LOW (ref 3.8–5.2)
RBC # FLD: 13 % — SIGNIFICANT CHANGE UP (ref 10.3–14.5)
WBC # BLD: 14.88 K/UL — HIGH (ref 3.8–10.5)
WBC # FLD AUTO: 14.88 K/UL — HIGH (ref 3.8–10.5)

## 2023-08-13 RX ORDER — ACETAMINOPHEN 500 MG
30.47 TABLET ORAL
Qty: 853.16 | Refills: 0
Start: 2023-08-13 | End: 2023-08-19

## 2023-08-13 RX ORDER — ACETAMINOPHEN 500 MG
965 TABLET ORAL EVERY 6 HOURS
Refills: 0 | Status: DISCONTINUED | OUTPATIENT
Start: 2023-08-13 | End: 2023-08-13

## 2023-08-13 RX ORDER — IBUPROFEN 200 MG
0 TABLET ORAL
Qty: 2100 | Refills: 0
Start: 2023-08-13

## 2023-08-13 RX ORDER — IBUPROFEN 200 MG
30 TABLET ORAL
Qty: 0 | Refills: 0 | DISCHARGE
Start: 2023-08-13

## 2023-08-13 RX ORDER — ACETAMINOPHEN 500 MG
975 TABLET ORAL EVERY 6 HOURS
Refills: 0 | Status: DISCONTINUED | OUTPATIENT
Start: 2023-08-13 | End: 2023-08-15

## 2023-08-13 RX ORDER — ACETAMINOPHEN 500 MG
30.47 TABLET ORAL
Qty: 0 | Refills: 0 | DISCHARGE
Start: 2023-08-13

## 2023-08-13 RX ORDER — ACETAMINOPHEN 500 MG
30.47 TABLET ORAL
Qty: 0 | Refills: 0
Start: 2023-08-13

## 2023-08-13 RX ORDER — IBUPROFEN 200 MG
600 TABLET ORAL EVERY 6 HOURS
Refills: 0 | Status: DISCONTINUED | OUTPATIENT
Start: 2023-08-13 | End: 2023-08-15

## 2023-08-13 RX ADMIN — SCOPALAMINE 1 PATCH: 1 PATCH, EXTENDED RELEASE TRANSDERMAL at 15:44

## 2023-08-13 RX ADMIN — Medication 975 MILLIGRAM(S): at 03:29

## 2023-08-13 RX ADMIN — Medication 975 MILLIGRAM(S): at 20:42

## 2023-08-13 RX ADMIN — Medication 30 MILLIGRAM(S): at 05:49

## 2023-08-13 RX ADMIN — Medication 30 MILLIGRAM(S): at 12:37

## 2023-08-13 RX ADMIN — Medication 30 MILLIGRAM(S): at 18:09

## 2023-08-13 RX ADMIN — ENOXAPARIN SODIUM 60 MILLIGRAM(S): 100 INJECTION SUBCUTANEOUS at 10:15

## 2023-08-13 RX ADMIN — Medication 975 MILLIGRAM(S): at 10:15

## 2023-08-13 RX ADMIN — Medication 30 MILLIGRAM(S): at 00:30

## 2023-08-13 RX ADMIN — Medication 600 MILLIGRAM(S): at 23:42

## 2023-08-13 RX ADMIN — Medication 975 MILLIGRAM(S): at 15:21

## 2023-08-13 RX ADMIN — Medication 1 TABLET(S): at 12:39

## 2023-08-13 NOTE — PROGRESS NOTE ADULT - SUBJECTIVE AND OBJECTIVE BOX
ROMEO WICK is a 33y  now POD#1 s/p primary  section at 37w2d gestation, 2/2 NRFHT during second stage of labor. Had IOL for FGR and abnl uterine artery dopplers, GDMA1    S:    No acute events overnight.   The patient has no complaints.  Pain controlled with current treatment regimen.   She is ambulating with help from others and tolerating PO.   - flatus/-BM/+ voiding   She endorses appropriate lochia, which is decreasing.   She is breastfeeding without difficulty. She denies feeling engorged.   She denies fevers, chills, nausea and vomiting.   She denies lightheadedness, dizziness, palpitations, chest pain and SOB.     O:    T(C): 36.7 (23 @ 04:08), Max: 37.0 (23 @ 16:35)  HR: 61 (23 @ 04:08) (57 - 107)  BP: 110/73 (23 @ 04:08) (83/61 - 160/85)  RR: 18 (23 @ 04:08) (12 - 18)  SpO2: 96% (23 @ 04:08) (88% - 100%)    Gen: NAD, AOx3  Abdomen:  Soft, non-tender, non-distended, +bowel sounds  Incision: Pressure dressing clean/dry/intact. Left in place  Uterus:  Fundus firm below umbilicus  VE:  Expected lochia  Ext:  Bilateral lower extremities non-tender and non-edematous                          11.7   11.14 )-----------( 246      ( 11 Aug 2023 18:48 )             35.3    ROMEO WICK is a 33y  now POD#1 s/p primary  section at 37w2d gestation, 2/2 NRFHT during second stage of labor. Had IOL for FGR and abnl uterine artery dopplers, GDMA1    S:    No acute events overnight.   The patient has no complaints.  Pain controlled with current treatment regimen.   She is ambulating with help from others and tolerating PO.   - flatus/-BM/+ voiding   She endorses appropriate lochia, which is decreasing.   She is breastfeeding without difficulty. She denies feeling engorged.   She denies fevers, chills, nausea and vomiting.   She denies lightheadedness, dizziness, palpitations, chest pain and SOB.     O:    T(C): 36.7 (23 @ 04:08), Max: 37.0 (23 @ 16:35)  HR: 61 (23 @ 04:08) (57 - 107)  BP: 110/73 (23 @ 04:08) (83/61 - 160/85)  RR: 18 (23 @ 04:08) (12 - 18)  SpO2: 96% (23 @ 04:08) (88% - 100%)    Gen: NAD, AOx3  Abdomen:  Soft, non-tender, non-distended, +bowel sounds  Incision: Pressure dressing clean/dry/intact. Left in place  Uterus:  Fundus firm below umbilicus  VE:  Expected lochia  Ext:  Bilateral lower extremities non-tender and non-edematous                          11.7   11.14 )-----------( 246      ( 11 Aug 2023 18:48 )             35.3                9.7    14.88 )-----------( 163      (  @ 06:23 )             29.4

## 2023-08-13 NOTE — DISCHARGE NOTE OB - PLAN OF CARE
Patient POC FS wnl. No need for acute intervention at this time. f/u outpatient Patient with mild anemia secondary to acute blood loss with delivery, stable. Patient should follow with OB  at regular postpartum check, and to call doctor sooner if develop symptoms of anemia such as shortness of breath, lightheadedness, or fainting. If medication such as ferrous sulfate is prescribed, take as directed. 1) Please take ibuprofen and/or Tylenol as needed for pain as prescribed.  2) Nothing in the vagina for 6 weeks (including no sex, no tampons, and no douching).  3) Please call your doctor for a follow up your postpartum appointment in 1-2 weeks.  4) Please continue taking vitamins postpartum.   5) Please call the office sooner if you have heavy vaginal bleeding, severe abdominal pain, or fever > 100.4F.  6) You may resume regular daily activity as tolerated

## 2023-08-13 NOTE — DISCHARGE NOTE OB - CARE PLAN
1 Principal Discharge DX:	 delivery delivered  Assessment and plan of treatment:	1) Please take ibuprofen and/or Tylenol as needed for pain as prescribed.  2) Nothing in the vagina for 6 weeks (including no sex, no tampons, and no douching).  3) Please call your doctor for a follow up your postpartum appointment in 1-2 weeks.  4) Please continue taking vitamins postpartum.   5) Please call the office sooner if you have heavy vaginal bleeding, severe abdominal pain, or fever > 100.4F.  6) You may resume regular daily activity as tolerated  Secondary Diagnosis:	Gestational diabetes  Assessment and plan of treatment:	Patient POC FS wnl. No need for acute intervention at this time. f/u outpatient  Secondary Diagnosis:	Anemia due to acute blood loss  Assessment and plan of treatment:	Patient with mild anemia secondary to acute blood loss with delivery, stable. Patient should follow with OB  at regular postpartum check, and to call doctor sooner if develop symptoms of anemia such as shortness of breath, lightheadedness, or fainting. If medication such as ferrous sulfate is prescribed, take as directed.

## 2023-08-13 NOTE — DISCHARGE NOTE OB - MATERIALS PROVIDED
New Beginnings/Vaccinations/[facility] [place] Screening Program/  Immunization Record/Breastfeeding Log/Bottle Feeding Log/Breastfeeding Mother’s Support Group Information/Guide to Postpartum Care/[facility] Hearing Screen Program/Back To Sleep Handout/Shaken Baby Prevention Handout/Breastfeeding Guide and Packet/Birth Certificate Instructions

## 2023-08-13 NOTE — DISCHARGE NOTE OB - PATIENT PORTAL LINK FT
You can access the FollowMyHealth Patient Portal offered by [facility] by registering at the following website: [email or web address]. By joining [facility]’s FollowMyHealth portal, you will also be able to view your health information using other applications (apps) compatible with our system.

## 2023-08-13 NOTE — PROGRESS NOTE ADULT - ATTENDING COMMENTS
Patient seen and examined, agree with above. No complaints, pain well controlled, vitals normal, H&H appropriate for surgery. Continue with routine post-op care.    Barrie Melton MD

## 2023-08-13 NOTE — DISCHARGE NOTE OB - NS MD DC FALL RISK RISK
For information on Fall & Injury Prevention, visit: [email or web address] OR  [email or web address] OR  https://www.cdc.gov/steadi/patient.html

## 2023-08-13 NOTE — DISCHARGE NOTE OB - CARE PROVIDERS DIRECT ADDRESSES
[email or web address]

## 2023-08-13 NOTE — PROGRESS NOTE ADULT - SUBJECTIVE AND OBJECTIVE BOX
INTERVAL HPI/OVERNIGHT EVENTS:  33y Female s/p c section after labor epidural anesthesia with duramorph for post op analgesia on 08/12/23    Vital Signs Last 24 Hrs  T(C): 36.6 (13 Aug 2023 12:02), Max: 37.0 (12 Aug 2023 16:35)  T(F): 97.9 (13 Aug 2023 12:02), Max: 98.6 (12 Aug 2023 16:35)  HR: 67 (13 Aug 2023 12:02) (60 - 107)  BP: 98/64 (13 Aug 2023 12:02) (83/61 - 137/75)  BP(mean): --  RR: 18 (13 Aug 2023 12:02) (16 - 18)  SpO2: 96% (13 Aug 2023 12:02) (88% - 100%)    Parameters below as of 13 Aug 2023 12:02  Patient On (Oxygen Delivery Method): room air            Patient's overall anesthesia satisfied    Patients pain is well controlled     No respiratory events overnight    No pruritis at this time    Patient seen and doing well     No headache      No residual numbness or weakness, sensory and motor function intact    Site not examined     No anesthetic complications or complaints noted or reported          .

## 2023-08-13 NOTE — PROGRESS NOTE ADULT - ASSESSMENT
A/P:   33y  now POD#1 s/p primary  section at 37w2d gestation, 2/2 NRFHT during second stage of labor. Had IOL for FGR and abnl uterine artery dopplers, GDMA1  -Vital signs stable  -Hgb: 11.7 -> AM labs pending   -Voiding, tolerating PO, awaiting bowel function return  - Hawk removed. Pt spontaneously voided  -Advance care as tolerated   -Continue routine postpartum and postoperative care and education  -Healthy male infant, desires circumcision  - DVT ppx: Lovenox ordered/ Ambulation encouraged. SCDs while in bed.   -Dispo: Continue inpatient care     A/P:   33y  now POD#1 s/p primary  section at 37w2d gestation, 2/2 NRFHT during second stage of labor. Had IOL for FGR and abnl uterine artery dopplers, GDMA1  -Vital signs stable  -Hgb: 11.7 -> 9.7  -Voiding, tolerating PO, awaiting bowel function return  - Hawk removed. Pt spontaneously voided  -Advance care as tolerated   -Continue routine postpartum and postoperative care and education  -Healthy male infant, desires circumcision  - DVT ppx: Lovenox ordered/ Ambulation encouraged. SCDs while in bed.   -Dispo: Continue inpatient care

## 2023-08-13 NOTE — DISCHARGE NOTE OB - CARE PROVIDER_API CALL
Aneesh Carrington  Obstetrics and Gynecology  [address], [address]  [place] [address]  Phone: [phone]  Fax: [phone]  Established Patient  Follow Up Time:

## 2023-08-13 NOTE — DISCHARGE NOTE OB - HOSPITAL COURSE
Patient admitted for IOL, later found to have fetal intolerance of second stage of labor and decision was made for primary  delivery. Patient then underwent  delivery under spinal anesthesia in the OR. Post-operatively, she was transferred to the postpartum unit and monitored by the OBGYN teams following enhanced recovery after surgery protocols. Upon discharge she is voiding, tolerating PO, ambulating, and pain is controlled. Patient has no complaints at this time and endorses she is ready to go home.

## 2023-08-13 NOTE — DISCHARGE NOTE OB - MEDICATION SUMMARY - MEDICATIONS TO TAKE
I will START or STAY ON the medications listed below when I get home from the hospital:    ibuprofen 100 mg/5 mL oral suspension  -- 30 milliliter(s) by mouth every 6 hours As needed Moderate Pain (4 - 6)  -- Indication: For postpartum pain    acetaminophen 160 mg/5 mL oral suspension  -- 30.47 milliliter(s) by mouth every 6 hours  -- Indication: For postpartum pain

## 2023-08-14 RX ADMIN — Medication 600 MILLIGRAM(S): at 12:19

## 2023-08-14 RX ADMIN — Medication 975 MILLIGRAM(S): at 08:35

## 2023-08-14 RX ADMIN — ENOXAPARIN SODIUM 60 MILLIGRAM(S): 100 INJECTION SUBCUTANEOUS at 08:36

## 2023-08-14 RX ADMIN — Medication 975 MILLIGRAM(S): at 20:47

## 2023-08-14 RX ADMIN — Medication 600 MILLIGRAM(S): at 13:00

## 2023-08-14 RX ADMIN — Medication 600 MILLIGRAM(S): at 17:43

## 2023-08-14 RX ADMIN — Medication 600 MILLIGRAM(S): at 00:42

## 2023-08-14 RX ADMIN — Medication 600 MILLIGRAM(S): at 06:37

## 2023-08-14 RX ADMIN — Medication 600 MILLIGRAM(S): at 18:00

## 2023-08-14 RX ADMIN — Medication 600 MILLIGRAM(S): at 05:37

## 2023-08-14 RX ADMIN — Medication 975 MILLIGRAM(S): at 15:20

## 2023-08-14 NOTE — PROGRESS NOTE ADULT - ASSESSMENT
A/P:   33y  now POD#1 s/p primary  section at 37w2d gestation, 2/2 NRFHT during second stage of labor. Had IOL for FGR and abnl uterine artery dopplers, GDMA1  -Vital signs stable  -Hgb: 11.7 -> 9.7  -Voiding, tolerating PO, awaiting bowel function return  -Advance care as tolerated   -Continue routine postpartum and postoperative care and education  -Healthy male infant, desires circumcision  - DVT ppx: Lovenox/ Ambulation encouraged. SCDs while in bed.     -Dispo: Discharge home today pending attending approval  A/P:   33y  now POD#2 s/p primary  section at 37w2d gestation, 2/2 NRFHT during second stage of labor. Had IOL for FGR and abnl uterine artery dopplers, GDMA1  -Vital signs stable  -Hgb: 11.7 -> 9.7  -Voiding, tolerating PO, awaiting bowel function return  -Advance care as tolerated   -Continue routine postpartum and postoperative care and education  -Healthy male infant, desires circumcision  - DVT ppx: Lovenox/ Ambulation encouraged. SCDs while in bed.     -Dispo: Discharge home today pending attending approval

## 2023-08-14 NOTE — PROGRESS NOTE ADULT - SUBJECTIVE AND OBJECTIVE BOX
ROMEO WICK is a 33y  now POD#2 s/p primary  section at 37w2d gestation, 2/2 NRFHT during second stage of labor. Had IOL for FGR and abnl uterine artery dopplers, GDMA1    S:    No acute events overnight.   The patient has no complaints.  Pain controlled with current treatment regimen.   She is ambulating with help from others and tolerating PO.   +flatus/-BM/+ voiding   She endorses appropriate lochia, which is decreasing.   She is breastfeeding without difficulty.   She denies fevers, chills, nausea and vomiting.   She denies lightheadedness, dizziness, palpitations, chest pain and SOB.     O:    T(C): 36.7 (23 @ 04:08), Max: 37.0 (23 @ 16:35)  HR: 61 (23 @ 04:08) (57 - 107)  BP: 110/73 (23 @ 04:08) (83/61 - 160/85)  RR: 18 (23 @ 04:08) (12 - 18)  SpO2: 96% (23 @ 04:08) (88% - 100%)    Gen: NAD, AOx3  Abdomen:  Soft, non-tender, non-distended, +bowel sounds  Incision: Pressure dressing removed. Incision clean/dry/intact with suture in place  Uterus:  Fundus firm below umbilicus  VE:  Expected lochia  Ext:  Bilateral lower extremities non-tender and non-edematous                          11.7   11.14 )-----------( 246      ( 11 Aug 2023 18:48 )             35.3                9.7    14.88 )-----------( 163      (  @ 06:23 )             29.4

## 2023-08-15 VITALS
OXYGEN SATURATION: 99 % | TEMPERATURE: 98 F | RESPIRATION RATE: 16 BRPM | DIASTOLIC BLOOD PRESSURE: 73 MMHG | SYSTOLIC BLOOD PRESSURE: 113 MMHG | HEART RATE: 64 BPM

## 2023-08-15 PROCEDURE — 86900 BLOOD TYPING SEROLOGIC ABO: CPT

## 2023-08-15 PROCEDURE — 36415 COLL VENOUS BLD VENIPUNCTURE: CPT

## 2023-08-15 PROCEDURE — 59050 FETAL MONITOR W/REPORT: CPT

## 2023-08-15 PROCEDURE — 86780 TREPONEMA PALLIDUM: CPT

## 2023-08-15 PROCEDURE — 88307 TISSUE EXAM BY PATHOLOGIST: CPT

## 2023-08-15 PROCEDURE — 86901 BLOOD TYPING SEROLOGIC RH(D): CPT

## 2023-08-15 PROCEDURE — 82962 GLUCOSE BLOOD TEST: CPT

## 2023-08-15 PROCEDURE — 86850 RBC ANTIBODY SCREEN: CPT

## 2023-08-15 PROCEDURE — 85025 COMPLETE CBC W/AUTO DIFF WBC: CPT

## 2023-08-15 RX ADMIN — Medication 975 MILLIGRAM(S): at 10:00

## 2023-08-15 RX ADMIN — Medication 600 MILLIGRAM(S): at 00:16

## 2023-08-15 RX ADMIN — Medication 600 MILLIGRAM(S): at 11:34

## 2023-08-15 RX ADMIN — Medication 600 MILLIGRAM(S): at 05:18

## 2023-08-15 RX ADMIN — ENOXAPARIN SODIUM 60 MILLIGRAM(S): 100 INJECTION SUBCUTANEOUS at 09:10

## 2023-08-15 RX ADMIN — MAGNESIUM HYDROXIDE 30 MILLILITER(S): 400 TABLET, CHEWABLE ORAL at 00:16

## 2023-08-15 RX ADMIN — Medication 600 MILLIGRAM(S): at 12:15

## 2023-08-15 RX ADMIN — Medication 600 MILLIGRAM(S): at 01:16

## 2023-08-15 RX ADMIN — Medication 975 MILLIGRAM(S): at 09:09

## 2023-08-15 NOTE — PROGRESS NOTE ADULT - ASSESSMENT
A/P:   33y  now POD#3 s/p primary  section at 37w2d gestation, 2/2 NRFHT during second stage of labor. Had IOL for FGR and abnl uterine artery dopplers, GDMA1  -Vital signs stable  -Hgb: 11.7 -> 9.7  -Voiding, tolerating PO, passing gas  -Advance care as tolerated   -Continue routine postpartum and postoperative care and education  -Healthy male infant, desires circumcision  - DVT ppx: Lovenox/ Ambulation encouraged. SCDs while in bed.     -Dispo: Discharge home today pending attending approval

## 2023-08-15 NOTE — PROGRESS NOTE ADULT - SUBJECTIVE AND OBJECTIVE BOX
ROMEO WICK is a 33y  now POD#3 s/p primary  section at 37w2d gestation, 2/2 NRFHT during second stage of labor. Had IOL for FGR and abnl uterine artery dopplers, GDMA1    S:    No acute events overnight.   The patient has no complaints.  Pain controlled with current treatment regimen.   She is ambulating with help from others and tolerating PO.   +flatus/-BM/+ voiding   She endorses appropriate lochia, which is decreasing.   She is breastfeeding without difficulty.   She denies fevers, chills, nausea and vomiting.   She denies lightheadedness, dizziness, palpitations, chest pain and SOB.     O:    T(C): 36.7 (23 @ 04:08), Max: 37.0 (23 @ 16:35)  HR: 61 (23 @ 04:08) (57 - 107)  BP: 110/73 (23 @ 04:08) (83/61 - 160/85)  RR: 18 (23 @ 04:08) (12 - 18)  SpO2: 96% (23 @ 04:08) (88% - 100%)    Gen: NAD, AOx3  Abdomen:  Soft, non-tender, non-distended, +bowel sounds  Incision: Pressure dressing removed. Incision clean/dry/intact with suture in place  Uterus:  Fundus firm below umbilicus  VE:  Expected lochia  Ext:  Bilateral lower extremities non-tender and non-edematous                          11.7   11.14 )-----------( 246      ( 11 Aug 2023 18:48 )             35.3                9.7    14.88 )-----------( 163      (  @ 06:23 )             29.4

## 2023-08-16 PROBLEM — J45.909 UNSPECIFIED ASTHMA, UNCOMPLICATED: Chronic | Status: ACTIVE | Noted: 2023-08-11

## 2023-08-17 ENCOUNTER — APPOINTMENT (OUTPATIENT)
Dept: OBGYN | Facility: CLINIC | Age: 33
End: 2023-08-17
Payer: COMMERCIAL

## 2023-08-17 VITALS
HEIGHT: 61 IN | DIASTOLIC BLOOD PRESSURE: 85 MMHG | WEIGHT: 214.19 LBS | BODY MASS INDEX: 40.44 KG/M2 | SYSTOLIC BLOOD PRESSURE: 138 MMHG

## 2023-08-17 LAB
BILIRUB UR QL STRIP: NORMAL
BILIRUB UR QL STRIP: NORMAL
CLARITY UR: CLEAR
COLLECTION METHOD: NORMAL
GLUCOSE UR-MCNC: NORMAL
GLUCOSE UR-MCNC: NORMAL
HCG UR QL: 0.2 EU/DL
HCG UR QL: 0.2 EU/DL
HGB UR QL STRIP.AUTO: NORMAL
HGB UR QL STRIP.AUTO: NORMAL
KETONES UR-MCNC: NORMAL
KETONES UR-MCNC: NORMAL
LEUKOCYTE ESTERASE UR QL STRIP: NORMAL
LEUKOCYTE ESTERASE UR QL STRIP: NORMAL
NITRITE UR QL STRIP: NORMAL
NITRITE UR QL STRIP: NORMAL
PH UR STRIP: 6
PH UR STRIP: 6
PROT UR STRIP-MCNC: NORMAL
PROT UR STRIP-MCNC: NORMAL
SP GR UR STRIP: 1
SP GR UR STRIP: 1.02

## 2023-08-17 PROCEDURE — 99024 POSTOP FOLLOW-UP VISIT: CPT

## 2023-08-17 NOTE — PHYSICAL EXAM
[Chaperone Present] : A chaperone was present in the examining room during all aspects of the physical examination [FreeTextEntry1] : Amy Rollins CMA / Jarrett Mercado [Appropriately responsive] : appropriately responsive [Alert] : alert [No Acute Distress] : no acute distress [Soft] : soft [Non-tender] : non-tender [No Mass] : no mass [Oriented x3] : oriented x3 [FreeTextEntry7] : incision is healing well, excellent cosmesis. Some slight edema over mons, no ecchymoses.

## 2023-08-17 NOTE — HISTORY OF PRESENT ILLNESS
[N] : Patient is not sexually active [Menarche Age: ____] : age at menarche was [unfilled] [Previously active] : previously active [PapSmeardate] : 05/17/22 [TextBox_31] : neg [HIVDate] : 08/01/23 [TextBox_53] : neg [GonorrheaDate] : 01/19/23 [TextBox_63] : neg [ChlamydiaDate] : 01/19/23 [TextBox_68] : neg [HPVDate] : 05/17/22 [TextBox_78] : neg [LMPDate] : 11/21/22 [PGxTotal] : 1 [[facility] Center[facility]ulerm] : 1 [[facility] Valleyiving] : 1 [FreeTextEntry1] : 11/21/22

## 2023-08-17 NOTE — END OF VISIT
[FreeTextEntry3] : I, Lexis Mercado solely acted as a scribe for Dr. Aneesh Carrington on 08/17/2023 . All medical entries made by the scribe were at my, Dr. Carrington's, direction and personally dictated by me on 08/17/2023 . I have reviewed the chart and agree that the record accurately reflects my personal performance of the history, physical exam, assessment and plan. I have also personally directed, reviewed, and agreed with the chart.

## 2023-08-17 NOTE — REASON FOR VISIT
[Post-Op Visit] : a post-op visit for [FreeTextEntry2] :  2023, Baby Boy "Dannie" [id number], Breast Feeding

## 2023-08-17 NOTE — DISCUSSION/SUMMARY
[FreeTextEntry1] : -Patient is doing well post-op. C/S incision site: c/d/i. Healing well. Wound care reviewed. No signs of depression, frequent crying due to a dream or feeling "she is not doing enough."  -Recommended to massage and elevation of the lower extremities due to swelling at the feet.  -Recommended to take ibuprofen 600mg every 6 hours.   -Percocet prescribed to pharmacy. She will crush the pill and take it in jelly or applesauce.   -Anti fungal powder prescribed.  Ioana will return to office in two weeks or as needed.  All questions and concerns were addressed.

## 2023-08-22 ENCOUNTER — APPOINTMENT (OUTPATIENT)
Age: 33
End: 2023-08-22
Payer: COMMERCIAL

## 2023-08-22 PROCEDURE — S9443: CPT | Mod: 95

## 2023-08-24 ENCOUNTER — TRANSCRIPTION ENCOUNTER (OUTPATIENT)
Age: 33
End: 2023-08-24

## 2023-08-24 ENCOUNTER — NON-APPOINTMENT (OUTPATIENT)
Age: 33
End: 2023-08-24

## 2023-08-25 ENCOUNTER — TRANSCRIPTION ENCOUNTER (OUTPATIENT)
Age: 33
End: 2023-08-25

## 2023-08-31 ENCOUNTER — APPOINTMENT (OUTPATIENT)
Dept: OBGYN | Facility: CLINIC | Age: 33
End: 2023-08-31
Payer: COMMERCIAL

## 2023-08-31 ENCOUNTER — TRANSCRIPTION ENCOUNTER (OUTPATIENT)
Age: 33
End: 2023-08-31

## 2023-08-31 VITALS
WEIGHT: 196.06 LBS | BODY MASS INDEX: 37.02 KG/M2 | DIASTOLIC BLOOD PRESSURE: 83 MMHG | HEIGHT: 61 IN | SYSTOLIC BLOOD PRESSURE: 118 MMHG

## 2023-08-31 PROCEDURE — 0503F POSTPARTUM CARE VISIT: CPT

## 2023-09-01 NOTE — HISTORY OF PRESENT ILLNESS
[N] : Patient is not sexually active [Y] : Positive pregnancy history [Menarche Age: ____] : age at menarche was [unfilled] [TextBox_4] : s/p  2023, boy "Dannie", 5 lbs 6 ozs, breast and bottle feeding  [TextBox_31] : NEG [PapSmeardate] : 5/17/22 [HIVDate] : 8/1/23 [TextBox_53] : NEG [GonorrheaDate] : 1/19/23 [TextBox_63] : NEG [ChlamydiaDate] : 1/19/23 [TextBox_68] : NEG [HPVDate] : 5/17/22 [TextBox_78] : NEG [HepatitisBDate] : 1/19/23 [TextBox_83] : NEG [LMPDate] : 11/21/22 [[facility]] : 1 [PGxTotal] : 1 [[facility]iving] : 1 [FreeTextEntry1] : 11/21/22

## 2023-09-01 NOTE — PLAN
[FreeTextEntry1] : - I offered her the option of starting an antidepressant bur she is strongly opposed.  -we discussed the normal adjustments which occur with a new baby, and she will try to carve out some time for herself. She will ask her mother to monitor and care for the baby overnight so she can catch up on some sleep.  -we discussed important call parameters should she start to have unusual feelings or thoughts. -all questions were answered, and support was given.

## 2023-09-05 ENCOUNTER — NON-APPOINTMENT (OUTPATIENT)
Age: 33
End: 2023-09-05

## 2023-09-05 ENCOUNTER — TRANSCRIPTION ENCOUNTER (OUTPATIENT)
Age: 33
End: 2023-09-05

## 2023-09-11 ENCOUNTER — TRANSCRIPTION ENCOUNTER (OUTPATIENT)
Age: 33
End: 2023-09-11

## 2023-09-14 ENCOUNTER — APPOINTMENT (OUTPATIENT)
Dept: OBGYN | Facility: CLINIC | Age: 33
End: 2023-09-14
Payer: COMMERCIAL

## 2023-09-14 DIAGNOSIS — Z34.93 ENCOUNTER FOR SUPERVISION OF NORMAL PREGNANCY, UNSPECIFIED, THIRD TRIMESTER: ICD-10-CM

## 2023-09-14 DIAGNOSIS — O99.213 OBESITY COMPLICATING PREGNANCY, THIRD TRIMESTER: ICD-10-CM

## 2023-09-14 DIAGNOSIS — O36.5930 MATERNAL CARE FOR OTHER KNOWN OR SUSPECTED POOR FETAL GROWTH, THIRD TRIMESTER, NOT APPLICABLE OR UNSPECIFIED: ICD-10-CM

## 2023-09-14 DIAGNOSIS — Z3A.36 36 WEEKS GESTATION OF PREGNANCY: ICD-10-CM

## 2023-09-14 PROCEDURE — 99213 OFFICE O/P EST LOW 20 MIN: CPT

## 2023-09-19 ENCOUNTER — TRANSCRIPTION ENCOUNTER (OUTPATIENT)
Age: 33
End: 2023-09-19

## 2023-09-21 DIAGNOSIS — L30.4 ERYTHEMA INTERTRIGO: ICD-10-CM

## 2023-09-26 ENCOUNTER — TRANSCRIPTION ENCOUNTER (OUTPATIENT)
Age: 33
End: 2023-09-26

## 2023-10-04 ENCOUNTER — APPOINTMENT (OUTPATIENT)
Dept: OBGYN | Facility: CLINIC | Age: 33
End: 2023-10-04
Payer: COMMERCIAL

## 2023-10-04 VITALS
BODY MASS INDEX: 36.45 KG/M2 | HEIGHT: 61 IN | DIASTOLIC BLOOD PRESSURE: 79 MMHG | WEIGHT: 193.06 LBS | SYSTOLIC BLOOD PRESSURE: 112 MMHG

## 2023-10-04 DIAGNOSIS — Z12.4 ENCOUNTER FOR SCREENING FOR MALIGNANT NEOPLASM OF CERVIX: ICD-10-CM

## 2023-10-04 PROCEDURE — 99395 PREV VISIT EST AGE 18-39: CPT

## 2023-10-10 ENCOUNTER — TRANSCRIPTION ENCOUNTER (OUTPATIENT)
Age: 33
End: 2023-10-10

## 2023-10-17 LAB
SURGICAL PATHOLOGY STUDY: SIGNIFICANT CHANGE UP
SURGICAL PATHOLOGY STUDY: SIGNIFICANT CHANGE UP

## 2023-11-01 ENCOUNTER — APPOINTMENT (OUTPATIENT)
Dept: OBGYN | Facility: CLINIC | Age: 33
End: 2023-11-01
Payer: COMMERCIAL

## 2023-11-01 VITALS
BODY MASS INDEX: 36.25 KG/M2 | DIASTOLIC BLOOD PRESSURE: 79 MMHG | WEIGHT: 192 LBS | SYSTOLIC BLOOD PRESSURE: 111 MMHG | HEIGHT: 61 IN

## 2023-11-01 PROCEDURE — 0503F POSTPARTUM CARE VISIT: CPT

## 2023-11-02 LAB
CYTOLOGY CVX/VAG DOC THIN PREP: NORMAL
HPV HIGH+LOW RISK DNA PNL CVX: NOT DETECTED

## 2023-12-15 ENCOUNTER — APPOINTMENT (OUTPATIENT)
Dept: PULMONOLOGY | Facility: CLINIC | Age: 33
End: 2023-12-15
Payer: COMMERCIAL

## 2023-12-15 VITALS
DIASTOLIC BLOOD PRESSURE: 68 MMHG | SYSTOLIC BLOOD PRESSURE: 104 MMHG | BODY MASS INDEX: 37.57 KG/M2 | WEIGHT: 199 LBS | HEIGHT: 61 IN | OXYGEN SATURATION: 98 % | TEMPERATURE: 97.6 F | HEART RATE: 78 BPM

## 2023-12-15 DIAGNOSIS — G47.10 HYPERSOMNIA, UNSPECIFIED: ICD-10-CM

## 2023-12-15 DIAGNOSIS — J45.909 UNSPECIFIED ASTHMA, UNCOMPLICATED: ICD-10-CM

## 2023-12-15 PROCEDURE — 94010 BREATHING CAPACITY TEST: CPT

## 2023-12-15 PROCEDURE — 99204 OFFICE O/P NEW MOD 45 MIN: CPT | Mod: 25

## 2023-12-15 PROCEDURE — 94729 DIFFUSING CAPACITY: CPT

## 2023-12-15 PROCEDURE — 94727 GAS DIL/WSHOT DETER LNG VOL: CPT

## 2023-12-15 RX ORDER — NYSTATIN 100MM UNIT
POWDER (EA) MISCELLANEOUS
Qty: 1 | Refills: 1 | Status: DISCONTINUED | COMMUNITY
Start: 2023-09-21 | End: 2023-12-15

## 2023-12-15 RX ORDER — VITAMIN C, CALCIUM, IRON, VITAMIN D3, VITAMIN E, VITAMIN B1, VITAMIN B2, VITAMIN B3, VITAMIN B6, FOLIC ACID, IODINE, ZINC, COPPER, DOCUSATE SODIUM, DOCOSAHEXAENOIC ACID (DHA) 27-1-50 MG
KIT ORAL
Refills: 0 | Status: ACTIVE | COMMUNITY

## 2023-12-15 NOTE — PROCEDURE
[FreeTextEntry1] : Pulmonary function test 12/15/2023 no obstructive or restrictive lung disease.  Decreased ERV secondary to obesity.

## 2023-12-15 NOTE — HISTORY OF PRESENT ILLNESS
[Never] : never [TextBox_4] : 33 female no hx tobacco Presents today bec of severe snoring  x 2 yrs  worsened after childbirth no witness apnea   notes needs to sit up more in bed + sleeps  in other room no thrashing ++snort no am headache or dry mouth always tired upon arising no napping during day  occ teacher

## 2023-12-15 NOTE — REASON FOR VISIT
[Initial] : an initial visit [Sleep Evaluation] : sleep evaluation [Sleep Apnea] : sleep apnea [TextBox_44] : Patient states she snores and since she gave birth 4 months ago it has gotten worse.  She is always tired, fatigue, no energy.

## 2023-12-15 NOTE — DISCUSSION/SUMMARY
[FreeTextEntry1] : Ms. Herrera has symptoms consistent with obstructive  sleep apnea.  She snores heavily wakes herself up and is tired upon arising.  The patient needs a sleep study.  I discussed a home sleep study and she understands and agrees.  Once the results are available further recommendations regarding therapy will be made. The patient understands and agrees with plan of care. Today's office visit encompassed 46 minutes. I conducted an extensive history,physical exam and reviewed diagnosis and treatment options including diagnostic tests,radiology studies including cat scans and the use of prescription medication.

## 2023-12-15 NOTE — PHYSICAL EXAM
[No Acute Distress] : no acute distress [Normal Oropharynx] : normal oropharynx [Normal Appearance] : normal appearance [No Neck Mass] : no neck mass [Normal Rate/Rhythm] : normal rate/rhythm [Normal S1, S2] : normal s1, s2 [No Murmurs] : no murmurs [No Resp Distress] : no resp distress [Clear to Auscultation Bilaterally] : clear to auscultation bilaterally [No Abnormalities] : no abnormalities [Benign] : benign [Normal Gait] : normal gait [No Clubbing] : no clubbing [No Cyanosis] : no cyanosis [No Edema] : no edema [FROM] : FROM [Normal Color/ Pigmentation] : normal color/ pigmentation [No Focal Deficits] : no focal deficits [Oriented x3] : oriented x3 [Normal Affect] : normal affect [TextBox_2] : Heavyset female

## 2024-01-03 ENCOUNTER — OUTPATIENT (OUTPATIENT)
Dept: OUTPATIENT SERVICES | Facility: HOSPITAL | Age: 34
LOS: 1 days | End: 2024-01-03
Payer: COMMERCIAL

## 2024-01-03 DIAGNOSIS — G47.33 OBSTRUCTIVE SLEEP APNEA (ADULT) (PEDIATRIC): ICD-10-CM

## 2024-01-03 PROCEDURE — 95800 SLP STDY UNATTENDED: CPT

## 2024-01-03 PROCEDURE — 95800 SLP STDY UNATTENDED: CPT | Mod: 26

## 2024-02-02 ENCOUNTER — NON-APPOINTMENT (OUTPATIENT)
Age: 34
End: 2024-02-02

## 2024-02-02 ENCOUNTER — APPOINTMENT (OUTPATIENT)
Dept: PULMONOLOGY | Facility: CLINIC | Age: 34
End: 2024-02-02
Payer: COMMERCIAL

## 2024-02-02 VITALS
OXYGEN SATURATION: 97 % | HEART RATE: 87 BPM | DIASTOLIC BLOOD PRESSURE: 74 MMHG | TEMPERATURE: 97.3 F | WEIGHT: 200 LBS | BODY MASS INDEX: 37.76 KG/M2 | SYSTOLIC BLOOD PRESSURE: 122 MMHG | HEIGHT: 61 IN

## 2024-02-02 DIAGNOSIS — R06.83 SNORING: ICD-10-CM

## 2024-02-02 DIAGNOSIS — O24.410 GESTATIONAL DIABETES MELLITUS IN PREGNANCY, DIET CONTROLLED: ICD-10-CM

## 2024-02-02 PROCEDURE — 99214 OFFICE O/P EST MOD 30 MIN: CPT

## 2024-02-02 NOTE — PROCEDURE
[FreeTextEntry1] : Home sleep study performed 1/3/2024.  AHI 4.6 with O2 sat 89%. 46 total respiratory events. No significant sleep disordered breathing noted. Will discuss with patient.

## 2024-02-02 NOTE — REASON FOR VISIT
[Follow-Up] : a follow-up visit [Sleep Apnea] : sleep apnea [TextBox_44] : Pt states she had home sleep study done 01/03/24, no pulmonary complaints at this time.

## 2024-02-02 NOTE — DISCUSSION/SUMMARY
[FreeTextEntry1] : Ms. Herrera has symptoms consistent with obstructive sleep apnea.  Her home sleep study revealed an AHI 4.6 which is not consistent with obstructive sleep apnea/sleep disordered breathing.  As her symptoms are significant I favor an in lab study.  Home sleep studies can miss mild sleep apnea.  An in lab study will be more exact and I feel this is the best for this patient.  She understands and agrees.  I have referred her to the [facility] sleep lab for an in lab polysomnogram.  Based on these results further recommendations will be made. The patient understands and agrees with plan of care. Today's office visit encompassed 32 minutes. I conducted an extensive history, physical exam and reviewed diagnosis and treatment options including diagnostic tests,radiology studies including cat scans and the use of prescription medication.

## 2024-02-02 NOTE — PHYSICAL EXAM
[No Acute Distress] : no acute distress [Normal Appearance] : normal appearance [No Neck Mass] : no neck mass [Normal Rate/Rhythm] : normal rate/rhythm [Normal S1, S2] : normal s1, s2 [No Murmurs] : no murmurs [No Resp Distress] : no resp distress [Clear to Auscultation Bilaterally] : clear to auscultation bilaterally [No Abnormalities] : no abnormalities [Benign] : benign [Normal Gait] : normal gait [No Clubbing] : no clubbing [No Cyanosis] : no cyanosis [No Edema] : no edema [FROM] : FROM [Normal Color/ Pigmentation] : normal color/ pigmentation [No Focal Deficits] : no focal deficits [Oriented x3] : oriented x3 [Normal Affect] : normal affect [TextBox_2] : Heavyset female no acute respiratory distress [TextBox_11] : Narrow pharynx

## 2024-03-02 ENCOUNTER — NON-APPOINTMENT (OUTPATIENT)
Age: 34
End: 2024-03-02

## 2024-08-08 ENCOUNTER — APPOINTMENT (OUTPATIENT)
Dept: OBGYN | Facility: CLINIC | Age: 34
End: 2024-08-08

## 2024-08-08 ENCOUNTER — APPOINTMENT (OUTPATIENT)
Dept: ANTEPARTUM | Facility: CLINIC | Age: 34
End: 2024-08-08

## 2024-08-08 ENCOUNTER — ASOB RESULT (OUTPATIENT)
Age: 34
End: 2024-08-08

## 2024-08-08 PROCEDURE — 76856 US EXAM PELVIC COMPLETE: CPT | Mod: 59

## 2024-08-08 PROCEDURE — 76830 TRANSVAGINAL US NON-OB: CPT

## 2024-08-08 PROCEDURE — 99459 PELVIC EXAMINATION: CPT

## 2024-08-08 PROCEDURE — 99214 OFFICE O/P EST MOD 30 MIN: CPT

## 2024-08-11 NOTE — PLAN
[FreeTextEntry1] : -we discussed the issues associated with a fibroid and possible management and treatment options. We can just continue to monitor, there is an option for embolization by IR, we could consider a myomectomy (open or robotic). I reviewed the risks and issues associated with each option. She is interested in a robotic myomectomy, and I will refer her to Renan Flowers MD for a consultation. Contact information was given. All questions were answered.  She will RTO in 11/2014 for an annual and pap.  During this visit 30 minutes were spent face-to-face with greater than 50% of this time dedicated to counseling.

## 2024-08-11 NOTE — PHYSICAL EXAM
[Chaperone Present] : A chaperone was present in the examining room during all aspects of the physical examination [[id number]] : A chaperone was present during the pelvic exam. [FreeTextEntry2] : NICOLAS ROSARIO MA [Appropriately responsive] : appropriately responsive [Alert] : alert [No Acute Distress] : no acute distress [No Lymphadenopathy] : no lymphadenopathy [Regular Rate Rhythm] : regular rate rhythm [Clear to Auscultation B/L] : clear to auscultation bilaterally [Soft] : soft [Non-tender] : non-tender [Non-distended] : non-distended [No Mass] : no mass [Labia Majora] : normal [Labia Minora] : normal [Normal] : normal [Enlarged ___ wks] : enlarged [unfilled] ~Uweeks [Uterine Adnexae] : normal

## 2024-08-11 NOTE — PHYSICAL EXAM
[Chaperone Present] : A chaperone was present in the examining room during all aspects of the physical examination [[id number]] : A chaperone was present during the pelvic exam. [FreeTextEntry2] : NICOLAS ROSARIO MA [Appropriately responsive] : appropriately responsive [Alert] : alert [No Acute Distress] : no acute distress [No Lymphadenopathy] : no lymphadenopathy [Regular Rate Rhythm] : regular rate rhythm [Clear to Auscultation B/L] : clear to auscultation bilaterally [Soft] : soft [Non-tender] : non-tender [Non-distended] : non-distended [No Mass] : no mass [Labia Majora] : normal [Labia Minora] : normal [Normal] : normal [Enlarged ___ wks] : enlarged [unfilled] ~Uweeks [Uterine Adnexae] : normal

## 2024-08-11 NOTE — HISTORY OF PRESENT ILLNESS
[Patient reported PAP Smear was normal] : Patient reported PAP Smear was normal [N] : Patient does not use contraception [Y] : Positive pregnancy history [Menarche Age: ____] : age at menarche was [unfilled] [No] : Patient does not have concerns regarding sex [Currently Active] : currently active [Men] : men [PapSmeardate] : 10/04/2023 [HIVDate] : 08/2023 [TextBox_53] : neg [GonorrheaDate] : 01/2023 [TextBox_63] : neg [ChlamydiaDate] : 01/2023 [TextBox_68] : neg [HPVDate] : 10/04/2023 [TextBox_78] : neg [HepatitisBDate] : 01/2023 [LMPDate] : 07/2024 [TextBox_83] : neg [PGxTotal] : 1 [[facility] PhoenixxFulerm] : 1 [[facility] Campusiving] : 1 [FreeTextEntry1] : 07/2024

## 2024-08-11 NOTE — REVIEW OF SYSTEMS
[Patient Intake Form Reviewed] : Patient intake form was reviewed [Abn Vaginal bleeding] : abnormal vaginal bleeding [Negative] : Endocrine

## 2024-10-30 ENCOUNTER — APPOINTMENT (OUTPATIENT)
Dept: OBGYN | Facility: CLINIC | Age: 34
End: 2024-10-30

## 2024-11-13 ENCOUNTER — APPOINTMENT (OUTPATIENT)
Dept: OBGYN | Facility: CLINIC | Age: 34
End: 2024-11-13
Payer: COMMERCIAL

## 2024-11-13 VITALS
BODY MASS INDEX: 38.71 KG/M2 | WEIGHT: 205 LBS | HEIGHT: 61 IN | DIASTOLIC BLOOD PRESSURE: 82 MMHG | SYSTOLIC BLOOD PRESSURE: 122 MMHG

## 2024-11-13 DIAGNOSIS — Z01.419 ENCOUNTER FOR GYNECOLOGICAL EXAMINATION (GENERAL) (ROUTINE) W/OUT ABNORMAL FINDINGS: ICD-10-CM

## 2024-11-13 DIAGNOSIS — Z12.4 ENCOUNTER FOR SCREENING FOR MALIGNANT NEOPLASM OF CERVIX: ICD-10-CM

## 2024-11-13 PROCEDURE — 99459 PELVIC EXAMINATION: CPT

## 2024-11-13 PROCEDURE — 99395 PREV VISIT EST AGE 18-39: CPT

## 2024-11-14 LAB — HPV HIGH+LOW RISK DNA PNL CVX: NOT DETECTED

## 2024-11-18 LAB — CYTOLOGY CVX/VAG DOC THIN PREP: NORMAL

## 2025-03-15 ENCOUNTER — NON-APPOINTMENT (OUTPATIENT)
Age: 35
End: 2025-03-15

## 2025-04-15 NOTE — OB PROVIDER DELIVERY SUMMARY - NS_DELIVERYATTENDING1_OBGYN_ALL_OB_FT
Patient on continous bladder irrigation. Patient has a 3 way 22 fr 30 mL catheter with 0.9% Sodium Chloride irrigation. Report given to PACU RN Sha Weeks RN.   Jaylen Bhat MD none
